# Patient Record
Sex: MALE | Race: WHITE | NOT HISPANIC OR LATINO | Employment: OTHER | URBAN - NONMETROPOLITAN AREA
[De-identification: names, ages, dates, MRNs, and addresses within clinical notes are randomized per-mention and may not be internally consistent; named-entity substitution may affect disease eponyms.]

---

## 2017-01-05 RX ORDER — NITROGLYCERIN 0.4 MG/1
0.4 TABLET SUBLINGUAL
Qty: 25 TABLET | Refills: 2 | Status: SHIPPED | OUTPATIENT
Start: 2017-01-05 | End: 2017-01-06 | Stop reason: SDUPTHER

## 2017-01-06 RX ORDER — NITROGLYCERIN 0.4 MG/1
0.4 TABLET SUBLINGUAL
Qty: 25 TABLET | Refills: 2 | Status: SHIPPED | OUTPATIENT
Start: 2017-01-06 | End: 2018-11-05 | Stop reason: SDUPTHER

## 2017-05-04 ENCOUNTER — OFFICE VISIT (OUTPATIENT)
Dept: CARDIOLOGY | Facility: CLINIC | Age: 72
End: 2017-05-04

## 2017-05-04 VITALS
HEIGHT: 72 IN | DIASTOLIC BLOOD PRESSURE: 71 MMHG | SYSTOLIC BLOOD PRESSURE: 110 MMHG | HEART RATE: 63 BPM | WEIGHT: 184.8 LBS | BODY MASS INDEX: 25.03 KG/M2

## 2017-05-04 DIAGNOSIS — E78.2 MIXED HYPERLIPIDEMIA: ICD-10-CM

## 2017-05-04 DIAGNOSIS — I25.10 CORONARY ARTERY DISEASE INVOLVING NATIVE CORONARY ARTERY OF NATIVE HEART WITHOUT ANGINA PECTORIS: Primary | ICD-10-CM

## 2017-05-04 DIAGNOSIS — I10 ESSENTIAL HYPERTENSION: ICD-10-CM

## 2017-05-04 PROCEDURE — 99213 OFFICE O/P EST LOW 20 MIN: CPT | Performed by: NURSE PRACTITIONER

## 2017-05-04 PROCEDURE — 93000 ELECTROCARDIOGRAM COMPLETE: CPT | Performed by: NURSE PRACTITIONER

## 2017-05-04 RX ORDER — SIMVASTATIN 20 MG
20 TABLET ORAL NIGHTLY
Qty: 30 TABLET | Refills: 11 | Status: SHIPPED | OUTPATIENT
Start: 2017-05-04 | End: 2018-05-27 | Stop reason: SDUPTHER

## 2017-05-04 RX ORDER — CLOPIDOGREL BISULFATE 75 MG/1
75 TABLET ORAL DAILY
Qty: 30 TABLET | Refills: 11 | Status: SHIPPED | OUTPATIENT
Start: 2017-05-04 | End: 2017-11-15

## 2017-05-04 RX ORDER — METOPROLOL SUCCINATE 25 MG/1
25 TABLET, EXTENDED RELEASE ORAL DAILY
Qty: 30 TABLET | Refills: 11 | Status: SHIPPED | OUTPATIENT
Start: 2017-05-04 | End: 2018-08-31 | Stop reason: SDUPTHER

## 2017-06-28 ENCOUNTER — OFFICE VISIT (OUTPATIENT)
Dept: CARDIAC SURGERY | Facility: CLINIC | Age: 72
End: 2017-06-28

## 2017-06-28 VITALS
HEIGHT: 72 IN | BODY MASS INDEX: 24.52 KG/M2 | HEART RATE: 74 BPM | DIASTOLIC BLOOD PRESSURE: 78 MMHG | SYSTOLIC BLOOD PRESSURE: 124 MMHG | WEIGHT: 181 LBS | OXYGEN SATURATION: 99 %

## 2017-06-28 DIAGNOSIS — I71.20 THORACIC ANEURYSM WITHOUT MENTION OF RUPTURE: Primary | ICD-10-CM

## 2017-06-28 PROCEDURE — 99203 OFFICE O/P NEW LOW 30 MIN: CPT | Performed by: THORACIC SURGERY (CARDIOTHORACIC VASCULAR SURGERY)

## 2017-06-28 RX ORDER — TRAMADOL HYDROCHLORIDE 50 MG/1
50 TABLET ORAL EVERY 6 HOURS PRN
COMMUNITY
End: 2017-12-28

## 2017-11-07 ENCOUNTER — DOCUMENTATION (OUTPATIENT)
Dept: CARDIOLOGY | Facility: CLINIC | Age: 72
End: 2017-11-07

## 2017-11-07 NOTE — PROGRESS NOTES
I talked to Mr Chris today and he wants me to mail his order for his Lipid and he will get it done in [place].      I have put it in the mail.

## 2017-11-15 ENCOUNTER — OUTSIDE FACILITY SERVICE (OUTPATIENT)
Dept: CARDIOLOGY | Facility: CLINIC | Age: 72
End: 2017-11-15

## 2017-11-15 PROCEDURE — 99222 1ST HOSP IP/OBS MODERATE 55: CPT | Performed by: INTERNAL MEDICINE

## 2017-11-15 NOTE — PROGRESS NOTES
Pts wife called said pt was in the hospital and they did a lipid on him.  She is going to email it to Miguelina.

## 2017-11-16 DIAGNOSIS — R47.02 EXPRESSIVE DYSPHASIA: ICD-10-CM

## 2017-11-16 DIAGNOSIS — G45.9 TRANSIENT CEREBRAL ISCHEMIA, UNSPECIFIED TYPE: Primary | ICD-10-CM

## 2017-12-28 ENCOUNTER — OFFICE VISIT (OUTPATIENT)
Dept: CARDIOLOGY | Facility: CLINIC | Age: 72
End: 2017-12-28

## 2017-12-28 VITALS
SYSTOLIC BLOOD PRESSURE: 110 MMHG | HEART RATE: 63 BPM | OXYGEN SATURATION: 97 % | WEIGHT: 185 LBS | HEIGHT: 72 IN | DIASTOLIC BLOOD PRESSURE: 70 MMHG | BODY MASS INDEX: 25.06 KG/M2

## 2017-12-28 DIAGNOSIS — I25.10 CORONARY ARTERY DISEASE INVOLVING NATIVE CORONARY ARTERY OF NATIVE HEART WITHOUT ANGINA PECTORIS: ICD-10-CM

## 2017-12-28 DIAGNOSIS — I10 ESSENTIAL HYPERTENSION: ICD-10-CM

## 2017-12-28 DIAGNOSIS — Z72.0 TOBACCO ABUSE: ICD-10-CM

## 2017-12-28 DIAGNOSIS — E78.5 DYSLIPIDEMIA: ICD-10-CM

## 2017-12-28 DIAGNOSIS — G45.9 TRANSIENT CEREBRAL ISCHEMIA, UNSPECIFIED TYPE: Primary | ICD-10-CM

## 2017-12-28 PROCEDURE — 99214 OFFICE O/P EST MOD 30 MIN: CPT | Performed by: INTERNAL MEDICINE

## 2017-12-28 RX ORDER — NAPROXEN SODIUM 220 MG
220 TABLET ORAL 2 TIMES DAILY PRN
COMMUNITY
End: 2018-07-23 | Stop reason: ALTCHOICE

## 2017-12-28 RX ORDER — PANTOPRAZOLE SODIUM 40 MG/1
40 TABLET, DELAYED RELEASE ORAL DAILY
Refills: 3 | COMMUNITY
Start: 2017-12-13 | End: 2021-09-08 | Stop reason: SDUPTHER

## 2017-12-28 NOTE — PROGRESS NOTES
Reason for Visit: cardiovascular follow up.    HPI:  Solomon Perez is a 72 y.o. male is here today for hospital follow-up.  He was admitted back in November with a possible TIA.  Due to questionable history of atrial fibrillation patient was changed from Plavix to anticoagulation with Eliquis.  He has not had any further TIA episodes but did have one episode of dizziness.  An event monitor placed on discharge and he is still wearing this today.  He is transitioning care down to the [place] office since he lives only 7 miles from here.      Previous Cardiac Testing and Procedures:  - Exercise treadmill stress (02/07/2012) negative for ischemia  - Echo (05/12/2015) EF 60-65%, normal diastolic function  - Echo (11/14/2017) EF 55%, mild to moderate MR, mild AI  - Carotid ultrasound (11/14/2017) no hemodynamically significant carotid stenosis    Patient Active Problem List   Diagnosis   • Coronary artery disease involving native coronary artery of native heart without angina pectoris   • Essential hypertension   • Transient cerebral ischemia   • Expressive dysphasia       Social History   Substance Use Topics   • Smoking status: Heavy Tobacco Smoker     Packs/day: 0.50     Types: Cigarettes   • Smokeless tobacco: Former User      Comment: 5 cigg/day    • Alcohol use 1.2 oz/week     2 Cans of beer per week       Family History   Problem Relation Age of Onset   • Coronary artery disease Other    • Colon cancer Mother    • Heart attack Father    • Heart failure Father        The following portions of the patient's history were reviewed and updated as appropriate: allergies, current medications, past family history, past medical history, past social history, past surgical history and problem list.      Current Outpatient Prescriptions:   •  apixaban (ELIQUIS) 5 MG tablet tablet, Take 1 tablet by mouth 2 (Two) Times a Day., Disp: 60 tablet, Rfl: 11  •  aspirin 81 MG tablet, Take 1 tablet by mouth Daily., Disp: 30 tablet,  "Rfl: 11  •  metoprolol succinate XL (TOPROL-XL) 25 MG 24 hr tablet, Take 1 tablet by mouth Daily., Disp: 30 tablet, Rfl: 11  •  naproxen sodium (ALEVE) 220 MG tablet, Take 220 mg by mouth 2 (Two) Times a Day As Needed., Disp: , Rfl:   •  nitroglycerin (NITROSTAT) 0.4 MG SL tablet, Place 1 tablet under the tongue Every 5 (Five) Minutes As Needed for chest pain. Take no more than 3 doses in 15 minutes., Disp: 25 tablet, Rfl: 2  •  pantoprazole (PROTONIX) 40 MG EC tablet, Take 40 mg by mouth Daily., Disp: , Rfl: 3  •  simvastatin (ZOCOR) 20 MG tablet, Take 1 tablet by mouth Every Night., Disp: 30 tablet, Rfl: 11    Review of Systems   Constitution: Negative for chills, diaphoresis, fever, weakness and weight gain.   HENT: Negative for nosebleeds.    Eyes: Negative for visual disturbance.   Cardiovascular: Negative for chest pain, claudication, cyanosis, dyspnea on exertion, irregular heartbeat, leg swelling, near-syncope, orthopnea, palpitations, paroxysmal nocturnal dyspnea and syncope.   Respiratory: Negative for cough, hemoptysis, shortness of breath, sputum production and wheezing.    Hematologic/Lymphatic: Negative for bleeding problem.   Skin: Negative for color change and flushing.   Musculoskeletal: Positive for back pain and muscle cramps. Negative for falls and muscle weakness.   Gastrointestinal: Negative for bloating, abdominal pain, hematemesis, hematochezia, melena, nausea and vomiting.   Genitourinary: Negative for hematuria.   Neurological: Positive for headaches. Negative for dizziness and light-headedness.   Psychiatric/Behavioral: Negative for altered mental status and depression. The patient does not have insomnia and is not nervous/anxious.        Objective   /70 (BP Location: Left arm, Patient Position: Sitting, Cuff Size: Adult)  Pulse 63  Ht 182.9 cm (72\")  Wt 83.9 kg (185 lb)  SpO2 97%  BMI 25.09 kg/m2  Physical Exam   Constitutional: He is oriented to person, place, and time. He " appears well-developed and well-nourished.   HENT:   Head: Normocephalic and atraumatic.   Cardiovascular: Normal rate, regular rhythm and normal heart sounds.    No murmur heard.  Pulmonary/Chest: Effort normal and breath sounds normal.   Musculoskeletal: He exhibits no edema.   Neurological: He is alert and oriented to person, place, and time.   Skin: Skin is warm and dry.   Psychiatric: He has a normal mood and affect.     Procedures      ICD-10-CM ICD-9-CM   1. Transient cerebral ischemia, unspecified type G45.9 435.9   2. Coronary artery disease involving native coronary artery of native heart without angina pectoris I25.10 414.01   3. Essential hypertension I10 401.9   4. Dyslipidemia E78.5 272.4   5. Tobacco abuse Z72.0 305.1         Assessment/Plan:  1. TIA: Recent admission in November with a TIA.  Patient was changed from Plavix to Eliquis due to concern for a possible history of atrial fibrillation; although, no definitive confirmation of this can be found.  An event monitor was ordered on discharge and his still wearing it at this time.    2.  Coronary artery disease: Patient currently chest pain-free.  Currently managed on aspirin, metoprolol, and simvastatin.  Plavix discontinued when started on Eliquis.    3.  Essential hypertension: Pressure is well controlled today on current therapy.    4.  Dyslipidemia: Managed on simvastatin.    5.  Tobacco abuse: Continues to stop approximate half pack per day.'s and expresses some interest and willingness to attempt to quit.  Is opened in trying Chantix.  Will send in a prescription for this.

## 2017-12-29 ENCOUNTER — APPOINTMENT (OUTPATIENT)
Dept: CARDIOLOGY | Facility: HOSPITAL | Age: 72
End: 2017-12-29
Attending: THORACIC SURGERY (CARDIOTHORACIC VASCULAR SURGERY)

## 2017-12-29 ENCOUNTER — APPOINTMENT (OUTPATIENT)
Dept: CT IMAGING | Facility: HOSPITAL | Age: 72
End: 2017-12-29
Attending: THORACIC SURGERY (CARDIOTHORACIC VASCULAR SURGERY)

## 2018-01-02 ENCOUNTER — TELEPHONE (OUTPATIENT)
Dept: CARDIOLOGY | Facility: CLINIC | Age: 73
End: 2018-01-02

## 2018-01-02 NOTE — TELEPHONE ENCOUNTER
PATIENT WIFE SAID THAT DR FRENCH WAS GOING TO SEND IN CHANX FOR PATIENT TO TRY AND QUIT SMOKING BUT IT WASN'T CALLED IN - HE WOULD LIKE IT SENT INTO [facility] IN [place] TODAY IF POSSIBLE

## 2018-01-02 NOTE — TELEPHONE ENCOUNTER
CALLED AND AND INFORMED THE PT OF THE RX BEING SENT TO [facility] AND THAT IT WOULD SENT TO HIM BY SAT AT THE LATEST.   HE VOICED UNDERSTANDING

## 2018-01-08 DIAGNOSIS — R47.02 EXPRESSIVE DYSPHASIA: ICD-10-CM

## 2018-01-08 DIAGNOSIS — G45.9 TRANSIENT CEREBRAL ISCHEMIA, UNSPECIFIED TYPE: ICD-10-CM

## 2018-01-08 LAB — TOAL ENROLLMENT DAYS: 30

## 2018-01-08 NOTE — PROGRESS NOTES
Await Dr. Resendiz's final review and interpretation but no evidence of PAF. Will continue anticoagulation for TIA unless unable to tolerate or bleeding risk outweighs benefit.

## 2018-01-11 NOTE — PROGRESS NOTES
Patient notified of final event monitor results with no afib or arrhythmia.  Will continue Eliquis for now unless has bleeding or other intolerance.  Will keep f/u with Dr. Osman for TAA 2/6/18 and with Dr. Resendiz for 3/29/18.

## 2018-02-02 ENCOUNTER — HOSPITAL ENCOUNTER (OUTPATIENT)
Dept: CARDIOLOGY | Facility: HOSPITAL | Age: 73
Discharge: HOME | End: 2018-02-02
Attending: THORACIC SURGERY (CARDIOTHORACIC VASCULAR SURGERY) | Admitting: THORACIC SURGERY (CARDIOTHORACIC VASCULAR SURGERY)

## 2018-02-02 ENCOUNTER — HOSPITAL ENCOUNTER (OUTPATIENT)
Dept: CT IMAGING | Facility: HOSPITAL | Age: 73
Discharge: HOME | End: 2018-02-02
Attending: THORACIC SURGERY (CARDIOTHORACIC VASCULAR SURGERY)

## 2018-02-02 VITALS
WEIGHT: 185 LBS | BODY MASS INDEX: 25.06 KG/M2 | SYSTOLIC BLOOD PRESSURE: 134 MMHG | HEIGHT: 72 IN | DIASTOLIC BLOOD PRESSURE: 89 MMHG

## 2018-02-02 DIAGNOSIS — I71.20 ANEURYSM OF THORACIC AORTA (HCC): ICD-10-CM

## 2018-02-02 LAB
BH CV ECHO MEAS - AI DEC SLOPE: 180 CM/SEC^2
BH CV ECHO MEAS - AI MAX PG: 58.1 MMHG
BH CV ECHO MEAS - AI MAX VEL: 381 CM/SEC
BH CV ECHO MEAS - AI P1/2T: 620 MSEC
BH CV ECHO MEAS - AO MAX PG (FULL): 0.86 MMHG
BH CV ECHO MEAS - AO MAX PG: 5.1 MMHG
BH CV ECHO MEAS - AO MEAN PG (FULL): 1 MMHG
BH CV ECHO MEAS - AO MEAN PG: 3 MMHG
BH CV ECHO MEAS - AO ROOT AREA: 9.6 CM^2
BH CV ECHO MEAS - AO ROOT DIAM: 3.5 CM
BH CV ECHO MEAS - AO V2 MAX: 113 CM/SEC
BH CV ECHO MEAS - AO V2 MEAN: 75.9 CM/SEC
BH CV ECHO MEAS - AO V2 VTI: 24.5 CM
BH CV ECHO MEAS - ASC AORTA: 3.7 CM
BH CV ECHO MEAS - AVA(I,A): 2.7 CM^2
BH CV ECHO MEAS - AVA(I,D): 2.7 CM^2
BH CV ECHO MEAS - AVA(V,A): 2.9 CM^2
BH CV ECHO MEAS - AVA(V,D): 2.9 CM^2
BH CV ECHO MEAS - CONTRAST EF 4CH: 69 ML/M^2
BH CV ECHO MEAS - EDV(CUBED): 101.8 ML
BH CV ECHO MEAS - EDV(MOD-SP4): 90 ML
BH CV ECHO MEAS - EDV(TEICH): 100.8 ML
BH CV ECHO MEAS - EF(CUBED): 66.6 %
BH CV ECHO MEAS - EF(MOD-SP4): 69 %
BH CV ECHO MEAS - EF(TEICH): 58.1 %
BH CV ECHO MEAS - ESV(CUBED): 34 ML
BH CV ECHO MEAS - ESV(MOD-SP4): 27.9 ML
BH CV ECHO MEAS - ESV(TEICH): 42.2 ML
BH CV ECHO MEAS - FS: 30.6 %
BH CV ECHO MEAS - IVS/LVPW: 1
BH CV ECHO MEAS - IVSD: 0.88 CM
BH CV ECHO MEAS - LA DIMENSION: 3.5 CM
BH CV ECHO MEAS - LA/AO: 1
BH CV ECHO MEAS - LV MASS(C)D: 136.1 GRAMS
BH CV ECHO MEAS - LV MAX PG: 4.2 MMHG
BH CV ECHO MEAS - LV MEAN PG: 2 MMHG
BH CV ECHO MEAS - LV V1 MAX: 103 CM/SEC
BH CV ECHO MEAS - LV V1 MEAN: 73.9 CM/SEC
BH CV ECHO MEAS - LV V1 VTI: 21.1 CM
BH CV ECHO MEAS - LVIDD: 4.7 CM
BH CV ECHO MEAS - LVIDS: 3.2 CM
BH CV ECHO MEAS - LVLD AP4: 8.3 CM
BH CV ECHO MEAS - LVLS AP4: 6.5 CM
BH CV ECHO MEAS - LVOT AREA (M): 3.1 CM^2
BH CV ECHO MEAS - LVOT AREA: 3.1 CM^2
BH CV ECHO MEAS - LVOT DIAM: 2 CM
BH CV ECHO MEAS - LVPWD: 0.87 CM
BH CV ECHO MEAS - MR ALIAS VEL: 30.8 CM/SEC
BH CV ECHO MEAS - MR ERO: 0.03 CM^2
BH CV ECHO MEAS - MR FLOW RATE: 17.4 CM^3/SEC
BH CV ECHO MEAS - MR MAX PG: 103.1 MMHG
BH CV ECHO MEAS - MR MAX VEL: 507.5 CM/SEC
BH CV ECHO MEAS - MR MEAN PG: 75 MMHG
BH CV ECHO MEAS - MR MEAN VEL: 411 CM/SEC
BH CV ECHO MEAS - MR PISA RADIUS: 0.3 CM
BH CV ECHO MEAS - MR PISA: 0.57 CM^2
BH CV ECHO MEAS - MR VOLUME: 4.8 ML
BH CV ECHO MEAS - MR VTI: 139.5 CM
BH CV ECHO MEAS - MV A MAX VEL: 62.1 CM/SEC
BH CV ECHO MEAS - MV DEC TIME: 0.21 SEC
BH CV ECHO MEAS - MV E MAX VEL: 80 CM/SEC
BH CV ECHO MEAS - MV E/A: 1.3
BH CV ECHO MEAS - PA MAX PG: 1.6 MMHG
BH CV ECHO MEAS - PA V2 MAX: 62.6 CM/SEC
BH CV ECHO MEAS - RAP SYSTOLE: 5 MMHG
BH CV ECHO MEAS - RVSP: 19.3 MMHG
BH CV ECHO MEAS - SV(AO): 235.7 ML
BH CV ECHO MEAS - SV(CUBED): 67.8 ML
BH CV ECHO MEAS - SV(LVOT): 66.3 ML
BH CV ECHO MEAS - SV(MOD-SP4): 62.1 ML
BH CV ECHO MEAS - SV(TEICH): 58.6 ML
BH CV ECHO MEAS - TR MAX VEL: 189 CM/SEC
CREAT BLDA-MCNC: 1 MG/DL (ref 0.6–1.3)
E/E' RATIO: 14.4
LV EF 2D ECHO EST: 70 %
MAXIMAL PREDICTED HEART RATE: 149 BPM
STRESS TARGET HR: 127 BPM

## 2018-02-02 PROCEDURE — 71275 CT ANGIOGRAPHY CHEST: CPT

## 2018-02-02 PROCEDURE — 93306 TTE W/DOPPLER COMPLETE: CPT

## 2018-02-02 PROCEDURE — 93306 TTE W/DOPPLER COMPLETE: CPT | Performed by: INTERNAL MEDICINE

## 2018-02-02 PROCEDURE — 82565 ASSAY OF CREATININE: CPT

## 2018-02-02 PROCEDURE — 25010000002 PERFLUTREN 6.52 MG/ML SUSPENSION: Performed by: THORACIC SURGERY (CARDIOTHORACIC VASCULAR SURGERY)

## 2018-02-02 PROCEDURE — 0 IOPAMIDOL PER 1 ML: Performed by: THORACIC SURGERY (CARDIOTHORACIC VASCULAR SURGERY)

## 2018-02-02 RX ADMIN — PERFLUTREN 9.78 MG: 6.52 INJECTION, SUSPENSION INTRAVENOUS at 09:39

## 2018-02-02 RX ADMIN — IOPAMIDOL 150 ML: 755 INJECTION, SOLUTION INTRAVENOUS at 11:00

## 2018-02-28 ENCOUNTER — OFFICE VISIT (OUTPATIENT)
Dept: CARDIAC SURGERY | Facility: CLINIC | Age: 73
End: 2018-02-28

## 2018-02-28 VITALS
SYSTOLIC BLOOD PRESSURE: 118 MMHG | DIASTOLIC BLOOD PRESSURE: 70 MMHG | WEIGHT: 185 LBS | HEART RATE: 83 BPM | BODY MASS INDEX: 25.06 KG/M2 | HEIGHT: 72 IN | OXYGEN SATURATION: 99 %

## 2018-02-28 DIAGNOSIS — I71.20 THORACIC AORTIC ANEURYSM WITHOUT RUPTURE (HCC): ICD-10-CM

## 2018-02-28 DIAGNOSIS — I25.10 CORONARY ARTERY DISEASE INVOLVING NATIVE CORONARY ARTERY OF NATIVE HEART WITHOUT ANGINA PECTORIS: Primary | ICD-10-CM

## 2018-02-28 PROCEDURE — 99213 OFFICE O/P EST LOW 20 MIN: CPT | Performed by: THORACIC SURGERY (CARDIOTHORACIC VASCULAR SURGERY)

## 2018-03-14 PROBLEM — I71.20 THORACIC AORTIC ANEURYSM WITHOUT RUPTURE (HCC): Status: ACTIVE | Noted: 2018-03-14

## 2018-03-14 NOTE — PROGRESS NOTES
Subjective   Patient ID: Solomon Perez is a 72 y.o. male who is here for follow-up of a known aneurysm.   Chief Complaint   Patient presents with   • Thoracic Aneurysm     patient is here with scans to ck on aneursym     History of Present Illness      Since his last office visit no new events.  No chest pain.  No shortness of breath.      The following portions of the patient's history were reviewed and updated as appropriate: allergies, current medications, past family history, past medical history, past social history, past surgical history and problem list.  Still smoking.  Ribs have well healed.         Objective   Physical Exam   Constitutional: He is oriented to person, place, and time. He appears well-developed.   HENT:   Head: Normocephalic and atraumatic.   Mouth/Throat: Oropharynx is clear and moist.   Eyes: EOM are normal. Pupils are equal, round, and reactive to light.   Neck: Normal range of motion. Neck supple. No JVD present. No tracheal deviation present. No thyromegaly present.   Cardiovascular: Normal rate, regular rhythm, normal heart sounds and intact distal pulses.  Exam reveals no gallop and no friction rub.    No murmur heard.  Pulmonary/Chest: Effort normal and breath sounds normal. No respiratory distress. He has no wheezes. He has no rales. He exhibits no tenderness.   Abdominal: Soft. He exhibits no distension. There is no tenderness.   Musculoskeletal: Normal range of motion. He exhibits no edema.   Lymphadenopathy:     He has no cervical adenopathy.   Neurological: He is alert and oriented to person, place, and time. No cranial nerve deficit.   Skin: Skin is warm and dry.   Psychiatric: He has a normal mood and affect.       CT ANGIOGRAM CHEST W CONTRAST- 2/2/2018 10:13 AM CST      HISTORY: thoracic aortic aneurysm; I71.2-Thoracic aortic aneurysm,  without rupture      COMPARISON: None.      DOSE LENGTH PRODUCT: 327 mGy cm. Automated exposure control was also  utilized to decrease  patient radiation dose.     TECHNIQUE: Helical tomographic images of the chest were obtained after  the administration of intravenous contrast following angiogram protocol.  Additionally, 3D and multiplanar reformatted images were provided.        FINDINGS:    Pulmonary arteries: There is adequate enhancement of the pulmonary  arteries to evaluate for central and segmental pulmonary emboli. There  are no filling defects within the main, lobar, segmental or visualized  subsegmental pulmonary arteries.    .      Aorta and great vessels: The ascending aorta is prominent and is 43 mm  in diameter. There is no evidence of dissection. The great vessels arise  from aortic arch in a normal manner. Descending thoracic aorta is  unremarkable.. Coronary calcifications noted..     Visualized neck base: The imaged portion of the base of the neck appears  unremarkable.      Lungs: The lungs are clear. There is no mass, worrisome nodule, or  consolidation. No pleural effusion is seen. The trachea and bronchial  tree are patent.      Heart: The heart is normal in size. There is no pericardial effusion.      Mediastinum and lymph nodes: No enlarged mediastinal, hilar, or axillary  lymph nodes are present.      Skeletal and soft tissues: The osseous structures of the thorax and  surrounding soft tissues demonstrate no acute process.     Upper abdomen: The imaged portion of the upper abdomen demonstrates no  acute process.      IMPRESSION:  1. The ascending aorta is 43 mm in diameter. There is no evidence of  dissection.  2. Otherwise is exam is unremarkable.        This report was finalized on 02/02/2018 10:32 by Dr. Castro Mack MD.    Study Description     2D Echo with Color Flow and Doppler The study is technically difficult for diagnosis.Verbal consent was obtained from the patient to use Definity contrast in order to optimize the study.   Echocardiogram Findings     Left Ventricle Left ventricular systolic function is normal.  Calculated EF = 69%. Estimated EF = 70%. Normal left ventricular cavity size noted. Left ventricular diastolic dysfunction is noted (grade II w/high LAP) consistent with pseudonormalization.      Right Ventricle Normal right ventricular cavity size and systolic function noted.      Left Atrium Normal left atrial size noted.      Right Atrium Normal right atrial size noted.      Aortic Valve The aortic valve is abnormal in structure. The valve exhibits sclerosis. Trace aortic valve regurgitation is present. No aortic valve stenosis is present.      Mitral Valve The mitral valve is grossly normal in structure. Trace mitral valve regurgitation is present. No significant mitral valve stenosis is present.      Tricuspid Valve The tricuspid valve is grossly normal. No tricuspid valve stenosis is present. Trace tricuspid valve regurgitation is present. Estimated right ventricular systolic pressure from tricuspid regurgitation is normal (<35 mmHg).      Pulmonic Valve The pulmonic valve is grossly normal in structure. There is no significant pulmonic valve stenosis present. There is no pulmonic valve regurgitation present.      Greater Vessels Mild dilation of the aortic root is present.      Pericardium There is no evidence of pericardial effusion.              Solomon was seen today for thoracic aneurysm.    Diagnoses and all orders for this visit:    Coronary artery disease involving native coronary artery of native heart without angina pectoris    Thoracic aortic aneurysm without rupture  -     CT Angiogram Chest With Contrast; Future          Assessment/Plan       I discussed the findings on his CT scan the chest as well as the findings on his echocardiogram.  His a sending aortic aneurysm measures 4.3 cm in size and remained stable compared to his last study.  We discussed signs and symptoms of acute aortic pathology which she verbalizes understanding.  He is a smoker and has been counseled.    We discussed his weight  is acceptable for his age and height.  He was congratulated on his success.    RTC 1 year.

## 2018-03-29 ENCOUNTER — OFFICE VISIT (OUTPATIENT)
Dept: CARDIOLOGY | Facility: CLINIC | Age: 73
End: 2018-03-29

## 2018-03-29 VITALS
DIASTOLIC BLOOD PRESSURE: 80 MMHG | WEIGHT: 186 LBS | HEART RATE: 67 BPM | BODY MASS INDEX: 25.19 KG/M2 | OXYGEN SATURATION: 98 % | SYSTOLIC BLOOD PRESSURE: 120 MMHG | HEIGHT: 72 IN

## 2018-03-29 DIAGNOSIS — E78.5 DYSLIPIDEMIA: ICD-10-CM

## 2018-03-29 DIAGNOSIS — I10 ESSENTIAL HYPERTENSION: ICD-10-CM

## 2018-03-29 DIAGNOSIS — Z72.0 TOBACCO ABUSE: ICD-10-CM

## 2018-03-29 DIAGNOSIS — G45.9 TRANSIENT CEREBRAL ISCHEMIA, UNSPECIFIED TYPE: Primary | ICD-10-CM

## 2018-03-29 DIAGNOSIS — I25.10 CORONARY ARTERY DISEASE INVOLVING NATIVE CORONARY ARTERY OF NATIVE HEART WITHOUT ANGINA PECTORIS: ICD-10-CM

## 2018-03-29 PROCEDURE — 99214 OFFICE O/P EST MOD 30 MIN: CPT | Performed by: INTERNAL MEDICINE

## 2018-03-29 NOTE — PROGRESS NOTES
Reason for Visit: cardiovascular follow up.    HPI:  Solomon Perez is a 72 y.o. male is here today for follow-up.  He has done well since last visit.  He took the Chantix for a month and has quit smoking for the most part.  Will just occasionally have one with beer.  He denies any chest pain, palpitations, dizziness, syncope, PND, or orthopnea.  His blood pressure has been well controlled at home.      Previous Cardiac Testing and Procedures:  - Exercise treadmill stress (02/07/2012) negative for ischemia  - Echo (05/12/2015) EF 60-65%, normal diastolic function  - Echo (11/14/2017) EF 55%, mild to moderate MR, mild AI  - Carotid ultrasound (11/14/2017) no hemodynamically significant carotid stenosis  - Event monitor (01/08/2018) sinus rhythm throughout study, no significant pauses, arrhythmias, or events, no symptoms reported.    Patient Active Problem List   Diagnosis   • Coronary artery disease involving native coronary artery of native heart without angina pectoris   • Essential hypertension   • Transient cerebral ischemia   • Expressive dysphasia   • Thoracic aortic aneurysm without rupture   • Dyslipidemia       Social History   Substance Use Topics   • Smoking status: Former Smoker     Packs/day: 0.50     Types: Cigarettes   • Smokeless tobacco: Former User      Comment: 5 cigg/day    • Alcohol use 1.2 oz/week     2 Cans of beer per week       Family History   Problem Relation Age of Onset   • Coronary artery disease Other    • Colon cancer Mother    • Heart attack Father    • Heart failure Father        The following portions of the patient's history were reviewed and updated as appropriate: allergies, current medications, past family history, past medical history, past social history, past surgical history and problem list.      Current Outpatient Prescriptions:   •  apixaban (ELIQUIS) 5 MG tablet tablet, Take 1 tablet by mouth 2 (Two) Times a Day., Disp: 60 tablet, Rfl: 11  •  aspirin 81 MG tablet,  Take 1 tablet by mouth Daily., Disp: 30 tablet, Rfl: 11  •  metoprolol succinate XL (TOPROL-XL) 25 MG 24 hr tablet, Take 1 tablet by mouth Daily., Disp: 30 tablet, Rfl: 11  •  naproxen sodium (ALEVE) 220 MG tablet, Take 220 mg by mouth 2 (Two) Times a Day As Needed., Disp: , Rfl:   •  nitroglycerin (NITROSTAT) 0.4 MG SL tablet, Place 1 tablet under the tongue Every 5 (Five) Minutes As Needed for chest pain. Take no more than 3 doses in 15 minutes., Disp: 25 tablet, Rfl: 2  •  pantoprazole (PROTONIX) 40 MG EC tablet, Take 40 mg by mouth Daily., Disp: , Rfl: 3  •  simvastatin (ZOCOR) 20 MG tablet, Take 1 tablet by mouth Every Night., Disp: 30 tablet, Rfl: 11  •  varenicline (CHANTIX STARTING MONTH PAK) 0.5 MG X 11 & 1 MG X 42 tablet, Take 0.5 mg one daily on days 1-3 and and 0.5 mg twice daily on days 4-7.Then 1 mg twice daily for a total of 12 weeks., Disp: 60 tablet, Rfl: 5    Review of Systems   Constitution: Negative for chills, diaphoresis, fever, weakness and weight gain.   HENT: Negative for nosebleeds.    Eyes: Negative for visual disturbance.   Cardiovascular: Negative for chest pain, claudication, cyanosis, dyspnea on exertion, irregular heartbeat, leg swelling, near-syncope, orthopnea, palpitations, paroxysmal nocturnal dyspnea and syncope.   Respiratory: Negative for cough, hemoptysis, shortness of breath, sputum production and wheezing.    Hematologic/Lymphatic: Negative for bleeding problem.   Skin: Negative for color change and flushing.   Musculoskeletal: Positive for back pain and muscle cramps. Negative for falls and muscle weakness.   Gastrointestinal: Negative for bloating, abdominal pain, hematemesis, hematochezia, melena, nausea and vomiting.   Genitourinary: Negative for hematuria.   Neurological: Positive for headaches and light-headedness. Negative for dizziness.   Psychiatric/Behavioral: Negative for altered mental status and depression. The patient does not have insomnia and is not  "nervous/anxious.        Objective   /80 (BP Location: Right arm, Patient Position: Sitting, Cuff Size: Adult)   Pulse 67   Ht 182.9 cm (72\")   Wt 84.4 kg (186 lb)   SpO2 98%   BMI 25.23 kg/m²   Physical Exam   Constitutional: He is oriented to person, place, and time. He appears well-developed and well-nourished.   HENT:   Head: Normocephalic and atraumatic.   Cardiovascular: Normal rate, regular rhythm and normal heart sounds.    No murmur heard.  Pulmonary/Chest: Effort normal and breath sounds normal.   Musculoskeletal: He exhibits no edema.   Neurological: He is alert and oriented to person, place, and time.   Skin: Skin is warm and dry.   Psychiatric: He has a normal mood and affect.     Procedures      ICD-10-CM ICD-9-CM   1. Transient cerebral ischemia, unspecified type G45.9 435.9   2. Coronary artery disease involving native coronary artery of native heart without angina pectoris I25.10 414.01   3. Essential hypertension I10 401.9   4. Dyslipidemia E78.5 272.4   5. Tobacco abuse Z72.0 305.1         Assessment/Plan:  1. TIA: Recent admission in November with a TIA.  Patient was changed from Plavix to Eliquis due to concern for a possible history of atrial fibrillation; although, no definitive confirmation of this can be found.  An event monitor did not demonstrate any evidence of atrial fibrillation.     2.  Coronary artery disease: Patient remains chest pain-free.   continue therapy with aspirin, metoprolol, and simvastatin.      3.  Essential hypertension: Blood pressure remains well-controlled on current medications.     4.  Dyslipidemia: Continue simvastatin.     5.  Tobacco abuse: Has quit smoking for the most part with just an occasional cigarette when drinking.   patient on the importance of complete smoking cessation.    "

## 2018-05-29 RX ORDER — SIMVASTATIN 20 MG
TABLET ORAL
Qty: 30 TABLET | Refills: 10 | Status: SHIPPED | OUTPATIENT
Start: 2018-05-29 | End: 2018-08-31 | Stop reason: SDUPTHER

## 2018-07-23 ENCOUNTER — OFFICE VISIT (OUTPATIENT)
Dept: CARDIOLOGY | Facility: CLINIC | Age: 73
End: 2018-07-23

## 2018-07-23 VITALS
HEIGHT: 72 IN | BODY MASS INDEX: 24.38 KG/M2 | WEIGHT: 180 LBS | RESPIRATION RATE: 14 BRPM | DIASTOLIC BLOOD PRESSURE: 62 MMHG | OXYGEN SATURATION: 98 % | HEART RATE: 68 BPM | SYSTOLIC BLOOD PRESSURE: 114 MMHG

## 2018-07-23 DIAGNOSIS — I10 ESSENTIAL HYPERTENSION: ICD-10-CM

## 2018-07-23 DIAGNOSIS — G45.9 TRANSIENT CEREBRAL ISCHEMIA, UNSPECIFIED TYPE: Primary | ICD-10-CM

## 2018-07-23 DIAGNOSIS — I25.10 CORONARY ARTERY DISEASE INVOLVING NATIVE CORONARY ARTERY OF NATIVE HEART WITHOUT ANGINA PECTORIS: ICD-10-CM

## 2018-07-23 DIAGNOSIS — H53.121 TRANSIENT VISUAL LOSS OF RIGHT EYE: ICD-10-CM

## 2018-07-23 DIAGNOSIS — Z72.0 TOBACCO ABUSE: ICD-10-CM

## 2018-07-23 DIAGNOSIS — I48.91 ATRIAL FIBRILLATION BY ELECTROCARDIOGRAM (HCC): Chronic | ICD-10-CM

## 2018-07-23 PROBLEM — I25.2 MI, OLD: Status: ACTIVE | Noted: 2018-07-23

## 2018-07-23 PROCEDURE — 93000 ELECTROCARDIOGRAM COMPLETE: CPT | Performed by: NURSE PRACTITIONER

## 2018-07-23 PROCEDURE — 99213 OFFICE O/P EST LOW 20 MIN: CPT | Performed by: NURSE PRACTITIONER

## 2018-07-23 PROCEDURE — 99406 BEHAV CHNG SMOKING 3-10 MIN: CPT | Performed by: NURSE PRACTITIONER

## 2018-07-23 RX ORDER — ERGOCALCIFEROL 1.25 MG/1
50000 CAPSULE ORAL
COMMUNITY
End: 2020-09-02

## 2018-07-23 NOTE — ASSESSMENT & PLAN NOTE
Continue aspirin and Eliquis. Call if symptoms recur or worsen. MRA brain and neck 11/2017 without significant carotid disease and no evidence of CVA at that time.

## 2018-07-23 NOTE — PROGRESS NOTES
Subjective:     Encounter Date:07/23/2018    Chief Complaint:    Patient ID: Solomon Perez is a 73 y.o. male here today for cardiac evaluation sooner than scheduled due to transient loss of vision in R eye last week with no abnormalities found by optometrist. He denies missing any doses of aspirin or Eliquis.    HPI     Eye Problem    Additional comments: Patient lost vision in his right eye for about 1 min last Thursday while he was walking through [place]. He saw his eye doctor and they didn't find anything wrong with his eye but believes he may have had a piece of plaque break off behind his eye.  Patient deines any stroke like symptoms. On aspirin and Eliquis. Hx of TIA.            Coronary Artery Disease    Additional comments: no chest discomfort           Atrial Fibrillation    Additional comments: no palpitations, less bruising on Eliquis than on Plavix           Shortness of Breath    Additional comments: has been unable to quit smoking       Last edited by TAMELA Archibald on 7/23/2018  2:11 PM. (History)          History:   Past Medical History:   Diagnosis Date   • Aneurysm (CMS/[facility])    • Atrial fibrillation by electrocardiogram (CMS/[facility])    • Bilateral carotid bruits    • Chest pain    • Coronary artery disease involving native coronary artery of native heart with angina pectoris (CMS/[facility])      5/2015- ECHO: EF 60-65% 5/2015- DSE: negative for ischemia. 2012- Stress: negative for ischemia.   • Expressive dysphasia 11/16/2017   • History of essential hypertension    • History of PTCA     12/2010- drug-eluding stent to the LAD.   • Hyperlipidemia, mixed    • Malaise and fatigue    • MI, old     2010, LAD stent   • Peptic ulcer     NOS   • Transient cerebral ischemia 11/16/2017     Past Surgical History:   Procedure Laterality Date   • BACK SURGERY     • CORONARY STENT PLACEMENT     • SINUS SURGERY  04/2012     Social History     Social History   • Marital status:      Spouse name:  N/A   • Number of children: N/A   • Years of education: N/A     Occupational History   • Not on file.     Social History Main Topics   • Smoking status: Current Every Day Smoker     Packs/day: 0.50     Years: 40.00     Types: Cigarettes     Start date: 1955   • Smokeless tobacco: Former User      Comment: has quit several times up to 15 years, less than 1 ppd for a total of 40 years.    • Alcohol use 6.0 - 7.2 oz/week     10 - 12 Cans of beer per week   • Drug use: No   • Sexual activity: Defer     Other Topics Concern   • Not on file     Social History Narrative   • No narrative on file     Family History   Problem Relation Age of Onset   • Coronary artery disease Other    • Colon cancer Mother    • Heart attack Father    • Heart failure Father        Outpatient Prescriptions Marked as Taking for the 7/23/18 encounter (Office Visit) with TAMELA Archibald   Medication Sig Dispense Refill   • apixaban (ELIQUIS) 5 MG tablet tablet Take 1 tablet by mouth 2 (Two) Times a Day. 60 tablet 11   • aspirin 81 MG tablet Take 1 tablet by mouth Daily. 30 tablet 11   • metoprolol succinate XL (TOPROL-XL) 25 MG 24 hr tablet Take 1 tablet by mouth Daily. 30 tablet 11   • nitroglycerin (NITROSTAT) 0.4 MG SL tablet Place 1 tablet under the tongue Every 5 (Five) Minutes As Needed for chest pain. Take no more than 3 doses in 15 minutes. 25 tablet 2   • pantoprazole (PROTONIX) 40 MG EC tablet Take 40 mg by mouth Daily.  3   • simvastatin (ZOCOR) 20 MG tablet TAKE 1 TABLET EVERY NIGHT 30 tablet 10   • vitamin D (ERGOCALCIFEROL) 81606 units capsule capsule Take 50,000 Units by mouth Every 7 (Seven) Days.     • [DISCONTINUED] naproxen sodium (ALEVE) 220 MG tablet Take 220 mg by mouth 2 (Two) Times a Day As Needed.         Review of Systems:  Review of Systems   Constitution: Negative for chills, diaphoresis, fever, weakness and weight gain.   HENT: Negative for nosebleeds.    Eyes: Positive for vision loss in right eye (complete,  "transient visual loss 7/19/18 for 1 minute). Negative for visual disturbance.   Cardiovascular: Negative for chest pain, claudication, cyanosis, dyspnea on exertion, irregular heartbeat, leg swelling, near-syncope, orthopnea, palpitations, paroxysmal nocturnal dyspnea and syncope.   Respiratory: Negative for cough, hemoptysis, shortness of breath, sputum production and wheezing.    Hematologic/Lymphatic: Negative for bleeding problem.   Skin: Negative for color change and flushing.   Musculoskeletal: Positive for back pain and muscle cramps. Negative for falls and muscle weakness.   Gastrointestinal: Negative for bloating, abdominal pain, hematemesis, hematochezia, melena, nausea and vomiting.   Genitourinary: Negative for hematuria.   Neurological: Positive for headaches and light-headedness. Negative for dizziness.   Psychiatric/Behavioral: Negative for altered mental status and depression. The patient does not have insomnia and is not nervous/anxious.             Objective:   /62 (BP Location: Right arm, Patient Position: Sitting, Cuff Size: Adult)   Pulse 68   Resp 14   Ht 182.9 cm (72\")   Wt 81.6 kg (180 lb)   SpO2 98%   BMI 24.41 kg/m²   Wt Readings from Last 3 Encounters:   07/23/18 81.6 kg (180 lb)   03/29/18 84.4 kg (186 lb)   02/28/18 83.9 kg (185 lb)         Physical Exam   Constitutional: He is oriented to person, place, and time. He appears well-developed and well-nourished.   HENT:   Head: Normocephalic and atraumatic.   Eyes: No scleral icterus.   Neck: No JVD present.   Cardiovascular: Normal rate, regular rhythm, normal heart sounds and intact distal pulses.  Exam reveals no gallop and no friction rub.    No murmur heard.  Pulmonary/Chest: Effort normal and breath sounds normal.   Musculoskeletal: He exhibits no edema.   Neurological: He is alert and oriented to person, place, and time.   Skin: Skin is warm and dry.   Psychiatric: He has a normal mood and affect.   Vitals " reviewed.      Lab/Diagnostics Review:   - Exercise treadmill stress (02/07/2012) negative for ischemia  - Echo (05/12/2015) EF 60-65%, normal diastolic function  - Echo (11/14/2017) EF 55%, mild to moderate MR, mild AI  - Carotid ultrasound (11/14/2017) no hemodynamically significant carotid stenosis  - Event monitor (01/08/2018) sinus rhythm throughout study, no significant pauses, arrhythmias, or events, no symptoms reported.      ECG 12 Lead  Date/Time: 7/23/2018 2:17 PM  Performed by: MIRZA SAWYER  Authorized by: MIRZA SAWYER   Comparison: compared with previous ECG from 11/13/2017  Similar to previous ECG  Rhythm: sinus rhythm  Rate: normal  BPM: 68  QRS axis: normal  Clinical impression: normal ECG                Assessment/Plan:         Problem List Items Addressed This Visit        Cardiovascular and Mediastinum    Coronary artery disease involving native coronary artery of native heart without angina pectoris (Chronic)    Current Assessment & Plan     Stable, Continue present therapy.           Essential hypertension (Chronic)    Current Assessment & Plan     Well controlled with medications.         Transient cerebral ischemia - Primary (Chronic)    Current Assessment & Plan     Continue aspirin and Eliquis. Call if symptoms recur or worsen. MRA brain and neck 11/2017 without significant carotid disease and no evidence of CVA at that time.          Relevant Orders    ECG 12 Lead (Completed)    Atrial fibrillation by electrocardiogram (CMS/[facility]) (Chronic)    Current Assessment & Plan     Paroxysmal, maintaining sinus rhythm            Other    Tobacco abuse (Chronic)    Current Assessment & Plan     Counseled on smoking cessation for 3 minutes. He will consider quitting again. Declines pharmacotherapy at this time.         Transient visual loss of right eye    Current Assessment & Plan     Possible TIA, no bleed per eye doctor. Continue present therapy. Call if recurs.              Return  for Next scheduled follow up 9/2018 with Dr. Resendiz.           Samantha Castaneda, APRN, ACNP-BC, CHFN-BC

## 2018-07-23 NOTE — ASSESSMENT & PLAN NOTE
Counseled on smoking cessation for 3 minutes. He will consider quitting again. Declines pharmacotherapy at this time.

## 2018-09-04 RX ORDER — METOPROLOL SUCCINATE 25 MG/1
25 TABLET, EXTENDED RELEASE ORAL DAILY
Qty: 45 TABLET | Refills: 3 | Status: SHIPPED | OUTPATIENT
Start: 2018-09-04 | End: 2019-08-15 | Stop reason: SDUPTHER

## 2018-09-04 RX ORDER — SIMVASTATIN 20 MG
20 TABLET ORAL NIGHTLY
Qty: 90 TABLET | Refills: 3 | Status: SHIPPED | OUTPATIENT
Start: 2018-09-04 | End: 2019-08-15 | Stop reason: SDUPTHER

## 2018-11-02 ENCOUNTER — TELEPHONE (OUTPATIENT)
Dept: CARDIOLOGY | Facility: CLINIC | Age: 73
End: 2018-11-02

## 2018-11-02 NOTE — TELEPHONE ENCOUNTER
Ok to stop Eliquis for 3 days prior to colonscopy but prefer he not stop aspirin given history of cardiac stent and previous possible TIA. Please notify Dr. Bowden's office. TX!

## 2018-11-05 DIAGNOSIS — I25.10 CORONARY ARTERY DISEASE INVOLVING NATIVE CORONARY ARTERY OF NATIVE HEART WITHOUT ANGINA PECTORIS: Primary | Chronic | ICD-10-CM

## 2018-11-05 RX ORDER — NITROGLYCERIN 0.4 MG/1
0.4 TABLET SUBLINGUAL
Qty: 25 TABLET | Refills: 11 | Status: SHIPPED | OUTPATIENT
Start: 2018-11-05 | End: 2019-10-28 | Stop reason: SDUPTHER

## 2018-11-05 NOTE — TELEPHONE ENCOUNTER
Pt called and wanted to make sure that you knew that he has an aneurysm.  He didn't know if that would affect him having the colonoscopy on Thursday.

## 2018-11-05 NOTE — TELEPHONE ENCOUNTER
He would like you to send in a script for Nitroglycerin to [facility] in [place].  He said he does not use them hardly at all, but his script is  and he does keep them with him.

## 2018-11-05 NOTE — TELEPHONE ENCOUNTER
Dr. Osman has been following his 4.3 cm ascending thoracic aneurysm that has been stable. Should be ok to have colonoscopy but he can check with Dr. Osman if he wishes. He needs to be sure Dr. Bowden is aware. TX!

## 2019-02-22 ENCOUNTER — TRANSCRIBE ORDERS (OUTPATIENT)
Dept: CARDIAC SURGERY | Facility: CLINIC | Age: 74
End: 2019-02-22

## 2019-02-22 DIAGNOSIS — I71.20 THORACIC AORTIC ANEURYSM WITHOUT RUPTURE (HCC): Primary | ICD-10-CM

## 2019-03-11 ENCOUNTER — HOSPITAL ENCOUNTER (OUTPATIENT)
Dept: CT IMAGING | Facility: HOSPITAL | Age: 74
Discharge: HOME | End: 2019-03-11
Admitting: THORACIC SURGERY (CARDIOTHORACIC VASCULAR SURGERY)

## 2019-03-11 DIAGNOSIS — I71.20 THORACIC AORTIC ANEURYSM WITHOUT RUPTURE (HCC): ICD-10-CM

## 2019-03-11 LAB — CREAT BLDA-MCNC: 1.1 MG/DL (ref 0.6–1.3)

## 2019-03-11 PROCEDURE — 0 IOPAMIDOL PER 1 ML: Performed by: THORACIC SURGERY (CARDIOTHORACIC VASCULAR SURGERY)

## 2019-03-11 PROCEDURE — 82565 ASSAY OF CREATININE: CPT

## 2019-03-11 PROCEDURE — 71275 CT ANGIOGRAPHY CHEST: CPT

## 2019-03-11 RX ADMIN — IOPAMIDOL 100 ML: 755 INJECTION, SOLUTION INTRAVENOUS at 10:07

## 2019-03-13 ENCOUNTER — OFFICE VISIT (OUTPATIENT)
Dept: CARDIAC SURGERY | Facility: CLINIC | Age: 74
End: 2019-03-13

## 2019-03-13 ENCOUNTER — APPOINTMENT (OUTPATIENT)
Dept: CT IMAGING | Facility: HOSPITAL | Age: 74
End: 2019-03-13

## 2019-03-13 VITALS
WEIGHT: 185.4 LBS | SYSTOLIC BLOOD PRESSURE: 118 MMHG | BODY MASS INDEX: 25.11 KG/M2 | HEART RATE: 67 BPM | DIASTOLIC BLOOD PRESSURE: 62 MMHG | HEIGHT: 72 IN | OXYGEN SATURATION: 99 %

## 2019-03-13 DIAGNOSIS — I71.20 THORACIC AORTIC ANEURYSM WITHOUT RUPTURE (HCC): Primary | ICD-10-CM

## 2019-03-13 DIAGNOSIS — I48.91 ATRIAL FIBRILLATION BY ELECTROCARDIOGRAM (HCC): Chronic | ICD-10-CM

## 2019-03-13 PROCEDURE — 99213 OFFICE O/P EST LOW 20 MIN: CPT | Performed by: THORACIC SURGERY (CARDIOTHORACIC VASCULAR SURGERY)

## 2019-04-02 NOTE — PROGRESS NOTES
Subjective   Patient ID: Solomon Perez is a 73 y.o. male who is here for follow-up of a known aneurysm.   Chief Complaint   Patient presents with   • Thoracic Aneurysm     Patient is here for 1 yr follow up.      History of Present Illness    No new events other than colonoscopy.  No concerning findings.  No chest pain.  No shortness of breath.    Still smoking.   The following portions of the patient's history were reviewed and updated as appropriate: allergies, current medications, past family history, past medical history, past social history, past surgical history and problem list.           Objective   Physical Exam   Constitutional: He is oriented to person, place, and time. He appears well-developed.   HENT:   Head: Normocephalic and atraumatic.   Mouth/Throat: Oropharynx is clear and moist.   Eyes: EOM are normal. Pupils are equal, round, and reactive to light.   Neck: Normal range of motion. Neck supple. No JVD present. No tracheal deviation present. No thyromegaly present.   Cardiovascular: Normal rate, regular rhythm, normal heart sounds and intact distal pulses. Exam reveals no gallop and no friction rub.   No murmur heard.  Pulmonary/Chest: Effort normal and breath sounds normal. No respiratory distress. He has no wheezes. He has no rales. He exhibits no tenderness.   Abdominal: Soft. He exhibits no distension. There is no tenderness.   Musculoskeletal: Normal range of motion. He exhibits no edema.   Lymphadenopathy:     He has no cervical adenopathy.   Neurological: He is alert and oriented to person, place, and time. No cranial nerve deficit.   Skin: Skin is warm and dry.   Psychiatric: He has a normal mood and affect.       CT ANGIOGRAM CHEST W CONTRAST- 2/2/2018 10:13 AM CST      HISTORY: thoracic aortic aneurysm; I71.2-Thoracic aortic aneurysm,  without rupture      COMPARISON: None.      DOSE LENGTH PRODUCT: 327 mGy cm. Automated exposure control was also  utilized to decrease patient radiation  dose.     TECHNIQUE: Helical tomographic images of the chest were obtained after  the administration of intravenous contrast following angiogram protocol.  Additionally, 3D and multiplanar reformatted images were provided.        FINDINGS:    Pulmonary arteries: There is adequate enhancement of the pulmonary  arteries to evaluate for central and segmental pulmonary emboli. There  are no filling defects within the main, lobar, segmental or visualized  subsegmental pulmonary arteries.    .      Aorta and great vessels: The ascending aorta is prominent and is 43 mm  in diameter. There is no evidence of dissection. The great vessels arise  from aortic arch in a normal manner. Descending thoracic aorta is  unremarkable.. Coronary calcifications noted..     Visualized neck base: The imaged portion of the base of the neck appears  unremarkable.      Lungs: The lungs are clear. There is no mass, worrisome nodule, or  consolidation. No pleural effusion is seen. The trachea and bronchial  tree are patent.      Heart: The heart is normal in size. There is no pericardial effusion.      Mediastinum and lymph nodes: No enlarged mediastinal, hilar, or axillary  lymph nodes are present.      Skeletal and soft tissues: The osseous structures of the thorax and  surrounding soft tissues demonstrate no acute process.     Upper abdomen: The imaged portion of the upper abdomen demonstrates no  acute process.      IMPRESSION:  1. The ascending aorta is 43 mm in diameter. There is no evidence of  dissection.  2. Otherwise is exam is unremarkable.        This report was finalized on 02/02/2018 10:32 by Dr. Castro Mack MD.    Study Description     2D Echo with Color Flow and Doppler The study is technically difficult for diagnosis.Verbal consent was obtained from the patient to use Definity contrast in order to optimize the study.   Echocardiogram Findings     Left Ventricle Left ventricular systolic function is normal. Calculated EF =  69%. Estimated EF = 70%. Normal left ventricular cavity size noted. Left ventricular diastolic dysfunction is noted (grade II w/high LAP) consistent with pseudonormalization.      Right Ventricle Normal right ventricular cavity size and systolic function noted.      Left Atrium Normal left atrial size noted.      Right Atrium Normal right atrial size noted.      Aortic Valve The aortic valve is abnormal in structure. The valve exhibits sclerosis. Trace aortic valve regurgitation is present. No aortic valve stenosis is present.      Mitral Valve The mitral valve is grossly normal in structure. Trace mitral valve regurgitation is present. No significant mitral valve stenosis is present.      Tricuspid Valve The tricuspid valve is grossly normal. No tricuspid valve stenosis is present. Trace tricuspid valve regurgitation is present. Estimated right ventricular systolic pressure from tricuspid regurgitation is normal (<35 mmHg).      Pulmonic Valve The pulmonic valve is grossly normal in structure. There is no significant pulmonic valve stenosis present. There is no pulmonic valve regurgitation present.      Greater Vessels Mild dilation of the aortic root is present.      Pericardium There is no evidence of pericardial effusion.        Study Result     EXAMINATION: CT ANGIOGRAM CHEST W CONTRAST- 3/11/2019 10:24 AM CDT     HISTORY: Aortic disease, non-traumatic, known or suspect; I71.2-Thoracic  aortic aneurysm, without rupture     DOSE: 256 mGycm (Automatic exposure control technique was implemented in  an effort to keep the radiation dose as low as possible without  compromising image quality)     REPORT: Spiral CTA of the chest was performed after administration of  intravenous contrast from the thoracic inlet through the upper abdomen.  Reconstructed 3-D, coronal and sagittal images were also reviewed.     Comparison: CTA chest 2/2/2018.     Review of lung windows demonstrates mild respiratory motion  artifact,  minimal bibasilar atelectasis. No lung mass or infiltrate is identified.  There is no pneumothorax or pleural effusion. Soft tissue windows show a  homogeneous appearance of the visualized thyroid gland. The contrast  bolus is satisfactory. The pulmonary arteries are normal in caliber,  without evidence of pulmonary embolism. The thoracic aorta is ectatic,  there is aneurysmal dilation of the ascending aorta, with maximum  diameter of 4.1 cm, essentially unchanged, given differences in  measuring technique. There is no evidence of aneurysm rupture and no  dissection is identified. The arch level, the aorta has a maximum  diameter of 2.9 cm, the descending thoracic aorta has a maximum diameter  2.7 cm. Heart size is normal. Heavy atherosclerotic calcification is  noted within the LAD coronary artery distribution as before. No  intrathoracic lymphadenopathy is identified. Mild mucosal thickening  involving the distal esophagus may be related to esophagitis. The  visualized upper abdomen is unremarkable. Review of bone windows shows  extensive degenerative spurring throughout the thoracic spine, lower  cervical spine.     IMPRESSION:  1. Stable CT of the chest, with aneurysmal dilation of the ascending  aorta, with a maximum diameter 4.1 cm, the slight size difference  compared with the previous CT this most likely due to measuring  technique. There is no evidence of aneurysm rupture or dissection.  2. Heavy calcified atherosclerosis but plaque within the LAD coronary  artery distribution.  3. Mild esophageal wall thickening distally may represent mild  esophagitis.              Solomon was seen today for thoracic aneurysm.    Diagnoses and all orders for this visit:    Thoracic aortic aneurysm without rupture (CMS/HCC)  -     CT Angiogram Chest With Contrast; Future    Atrial fibrillation by electrocardiogram (CMS/HCC)          Assessment/Plan       I discussed the findings on his CT scan the chest as well as  the findings on his echocardiogram.  His ascending aortic aneurysm measured 4.2 today and previously 4.3 cm in size and has remained essentially stable compared to his last study.  We discussed signs and symptoms of acute aortic pathology which she verbalizes understanding.  He is a smoker and has been counseled.    We discussed his weight is acceptable for his age and height.  He was congratulated on his success.    RTC 1 year.

## 2019-08-16 RX ORDER — SIMVASTATIN 20 MG
TABLET ORAL
Qty: 90 TABLET | Refills: 0 | Status: SHIPPED | OUTPATIENT
Start: 2019-08-16 | End: 2019-09-04 | Stop reason: SDUPTHER

## 2019-08-16 RX ORDER — METOPROLOL SUCCINATE 25 MG/1
TABLET, EXTENDED RELEASE ORAL
Qty: 45 TABLET | Refills: 0 | Status: SHIPPED | OUTPATIENT
Start: 2019-08-16 | End: 2019-09-04 | Stop reason: SDUPTHER

## 2019-08-16 RX ORDER — APIXABAN 5 MG/1
TABLET, FILM COATED ORAL
Qty: 180 TABLET | Refills: 0 | Status: SHIPPED | OUTPATIENT
Start: 2019-08-16 | End: 2019-09-04 | Stop reason: SDUPTHER

## 2019-08-16 NOTE — TELEPHONE ENCOUNTER
LAST APPT 7/23/18. PT NEEDS APPT FOR FURTHER MED REFILLS. WILL ONLY SEND IN 90 DAYS SUPPLY UNTIL PT IS SEEN.

## 2019-09-04 ENCOUNTER — OFFICE VISIT (OUTPATIENT)
Dept: CARDIOLOGY | Facility: CLINIC | Age: 74
End: 2019-09-04

## 2019-09-04 VITALS
WEIGHT: 175 LBS | BODY MASS INDEX: 23.7 KG/M2 | HEART RATE: 61 BPM | DIASTOLIC BLOOD PRESSURE: 68 MMHG | HEIGHT: 72 IN | SYSTOLIC BLOOD PRESSURE: 108 MMHG | OXYGEN SATURATION: 96 %

## 2019-09-04 DIAGNOSIS — E78.5 DYSLIPIDEMIA: ICD-10-CM

## 2019-09-04 DIAGNOSIS — I71.20 THORACIC AORTIC ANEURYSM WITHOUT RUPTURE (HCC): ICD-10-CM

## 2019-09-04 DIAGNOSIS — Z86.79 HX OF CARDIAC ARRHYTHMIA: ICD-10-CM

## 2019-09-04 DIAGNOSIS — Z86.73 HISTORY OF TIA (TRANSIENT ISCHEMIC ATTACK): ICD-10-CM

## 2019-09-04 DIAGNOSIS — Z72.0 TOBACCO ABUSE: Chronic | ICD-10-CM

## 2019-09-04 DIAGNOSIS — R42 DIZZINESS: ICD-10-CM

## 2019-09-04 DIAGNOSIS — I10 ESSENTIAL HYPERTENSION: Chronic | ICD-10-CM

## 2019-09-04 DIAGNOSIS — I25.10 CORONARY ARTERY DISEASE INVOLVING NATIVE CORONARY ARTERY OF NATIVE HEART WITHOUT ANGINA PECTORIS: Primary | Chronic | ICD-10-CM

## 2019-09-04 PROBLEM — I25.2 MI, OLD: Status: RESOLVED | Noted: 2018-07-23 | Resolved: 2019-09-04

## 2019-09-04 PROCEDURE — 99406 BEHAV CHNG SMOKING 3-10 MIN: CPT | Performed by: INTERNAL MEDICINE

## 2019-09-04 PROCEDURE — 93000 ELECTROCARDIOGRAM COMPLETE: CPT | Performed by: INTERNAL MEDICINE

## 2019-09-04 PROCEDURE — 99214 OFFICE O/P EST MOD 30 MIN: CPT | Performed by: INTERNAL MEDICINE

## 2019-09-04 RX ORDER — SIMVASTATIN 20 MG
20 TABLET ORAL NIGHTLY
Qty: 90 TABLET | Refills: 3 | Status: SHIPPED | OUTPATIENT
Start: 2019-09-04 | End: 2020-09-02 | Stop reason: SDUPTHER

## 2019-09-04 RX ORDER — METOPROLOL SUCCINATE 25 MG/1
TABLET, EXTENDED RELEASE ORAL
Qty: 45 TABLET | Refills: 3 | Status: SHIPPED | OUTPATIENT
Start: 2019-09-04 | End: 2020-09-02 | Stop reason: SDUPTHER

## 2019-09-04 NOTE — PROGRESS NOTES
Reason for Visit: cardiovascular follow up.    HPI:  Solomon Perez is a 74 y.o. male is here today for 1 year follow-up.  He is doing well for the most part.  He does note intermittent dizziness episodes.  He feels these several times a week.  Usually worse when he stands up quickly or looks up.  He does not check his blood pressure or pulse during his episodes.  He has not had any significant palpitations.  He denies any chest pain, syncope, PND, or orthopnea.  Continues to smoke about 1/2 pack/day.  He previously quit temporarily following [facility].  He has not ready or willing to quit at this time.    Previous Cardiac Testing and Procedures:  - Exercise treadmill stress (02/07/2012) negative for ischemia  - Echo (05/12/2015) EF 60-65%, normal diastolic function  - Echo (11/14/2017) EF 55%, mild to moderate MR, mild AI  - Carotid ultrasound (11/14/2017) no hemodynamically significant carotid stenosis  - Event monitor (1/8/2018) sinus rhythm throughout, no significant pauses, arrhythmias, or events, no symptoms  - CTA chest (3/11/2019) ascending aortic dilation up to 4.1 cm    Patient Active Problem List   Diagnosis   • Coronary artery disease involving native coronary artery of native heart without angina pectoris   • Essential hypertension   • Transient cerebral ischemia   • Expressive dysphasia   • Thoracic aortic aneurysm without rupture (CMS/HCC)   • Dyslipidemia   • Atrial fibrillation by electrocardiogram (CMS/[facility])   • Transient visual loss of right eye   • Tobacco abuse       Social History     Tobacco Use   • Smoking status: Current Every Day Smoker     Packs/day: 0.50     Years: 40.00     Pack years: 20.00     Types: Cigarettes     Start date: 1955   • Smokeless tobacco: Former User   • Tobacco comment: has quit several times up to 15 years, less than 1 ppd for a total of 40 years.    Substance Use Topics   • Alcohol use: Yes     Alcohol/week: 6.0 - 7.2 oz     Types: 10 - 12 Cans of beer per week   •  "Drug use: No       Family History   Problem Relation Age of Onset   • Coronary artery disease Other    • Colon cancer Mother    • Heart attack Father    • Heart failure Father        The following portions of the patient's history were reviewed and updated as appropriate: allergies, current medications, past family history, past medical history, past social history, past surgical history and problem list.      Current Outpatient Medications:   •  apixaban (ELIQUIS) 5 MG tablet tablet, Take 1 tablet by mouth Every 12 (Twelve) Hours., Disp: 180 tablet, Rfl: 3  •  aspirin 81 MG tablet, Take 1 tablet by mouth Daily., Disp: 30 tablet, Rfl: 11  •  metoprolol succinate XL (TOPROL-XL) 25 MG 24 hr tablet, Take 1/2 tablet daily, Disp: 45 tablet, Rfl: 3  •  pantoprazole (PROTONIX) 40 MG EC tablet, Take 40 mg by mouth Daily., Disp: , Rfl: 3  •  simvastatin (ZOCOR) 20 MG tablet, Take 1 tablet by mouth Every Night., Disp: 90 tablet, Rfl: 3  •  nitroglycerin (NITROSTAT) 0.4 MG SL tablet, Place 1 tablet under the tongue Every 5 (Five) Minutes As Needed for Chest Pain. Take no more than 3 doses in 15 minutes., Disp: 25 tablet, Rfl: 11  •  vitamin D (ERGOCALCIFEROL) 55326 units capsule capsule, Take 50,000 Units by mouth Every 7 (Seven) Days., Disp: , Rfl:     Review of Systems   Constitution: Negative for chills and fever.   Cardiovascular: Negative for chest pain and paroxysmal nocturnal dyspnea.   Respiratory: Negative for cough and shortness of breath.    Skin: Negative for rash.   Gastrointestinal: Negative for abdominal pain and heartburn.   Neurological: Positive for dizziness. Negative for numbness.       Objective   /68 (BP Location: Left arm, Patient Position: Sitting, Cuff Size: Adult)   Pulse 61   Ht 182.9 cm (72.01\")   Wt 79.4 kg (175 lb)   SpO2 96%   BMI 23.73 kg/m²   Physical Exam   Constitutional: He is oriented to person, place, and time. He appears well-developed and well-nourished.   HENT:   Head: " Normocephalic and atraumatic.   Cardiovascular: Normal rate, regular rhythm and normal heart sounds.   No murmur heard.  Pulmonary/Chest: Effort normal and breath sounds normal.   Musculoskeletal: He exhibits no edema.   Neurological: He is alert and oriented to person, place, and time.   Skin: Skin is warm and dry.   Psychiatric: He has a normal mood and affect.       ECG 12 Lead  Date/Time: 9/4/2019 10:25 AM  Performed by: Shawn Resendiz MD  Authorized by: Shawn Resendiz MD   Comparison: compared with previous ECG from 7/23/2018  Similar to previous ECG  Rhythm: sinus rhythm  Rate: normal  QRS axis: normal    Clinical impression: normal ECG              ICD-10-CM ICD-9-CM   1. Coronary artery disease involving native coronary artery of native heart without angina pectoris I25.10 414.01   2. Hx of cardiac arrhythmia Z86.79 V12.59   3. History of TIA (transient ischemic attack) Z86.73 V12.54   4. Thoracic aortic aneurysm without rupture (CMS/HCC) I71.2 441.2   5. Essential hypertension I10 401.9   6. Dyslipidemia E78.5 272.4   7. Tobacco abuse Z72.0 305.1   8. Dizziness R42 780.4         Assessment/Plan:  1.  Coronary artery disease: History of PCI in approximately 2010 at [facility].  Will obtain copy of catheter report.  No current chest pain.  Continue aspirin, metoprolol, and simvastatin.    2.  Questionable history of arrhythmia: Patient with questionable history of atrial fibrillation.  This was a suspected possible cause of his TIA; however, there is no EKG or monitor available that definitively demonstrates this.  He remains on anticoagulation with Eliquis.    3.  History of TIA: Unclear etiology.  Concern for possible atrial fibrillation but no definitive diagnosis.  Managed on anticoagulation with Eliquis.    4.  Thoracic aortic aneurysm: Up to 4.1 cm on CTA from 3/2019.  Continue to monitor.    5.  Essential hypertension: Blood pressures well controlled today.  Continue current medications.    6.   Dyslipidemia: Continue simvastatin.  Obtain copy of last lipid panel from PCP.    7.  Tobacco abuse: Solomon Perez is a current cigarettes user.  He currently smokes 10 cigarettes per day for a duration of 40 years. I have educated him on the risk of diseases from using tobacco products such as cancer, COPD and heart diease.  I advised him to quit and he is not willing to quit.  I spent 6 minutes counseling the patient.    8.  Dizziness: May have a component of orthostasis.  Symptoms usually occur when standing or looking up.  Encourage good hydration.  If worsens will consider stopping metoprolol.  Also could consider repeat monitor if symptoms worsen.

## 2019-10-28 DIAGNOSIS — I25.10 CORONARY ARTERY DISEASE INVOLVING NATIVE CORONARY ARTERY OF NATIVE HEART WITHOUT ANGINA PECTORIS: Chronic | ICD-10-CM

## 2019-10-29 RX ORDER — NITROGLYCERIN 0.4 MG/1
0.4 TABLET SUBLINGUAL
Qty: 25 TABLET | Refills: 11 | Status: SHIPPED | OUTPATIENT
Start: 2019-10-29 | End: 2023-04-04

## 2020-03-11 ENCOUNTER — OFFICE VISIT (OUTPATIENT)
Dept: CARDIAC SURGERY | Facility: CLINIC | Age: 75
End: 2020-03-11

## 2020-03-11 ENCOUNTER — HOSPITAL ENCOUNTER (OUTPATIENT)
Dept: CT IMAGING | Facility: HOSPITAL | Age: 75
Discharge: HOME | End: 2020-03-11
Admitting: THORACIC SURGERY (CARDIOTHORACIC VASCULAR SURGERY)

## 2020-03-11 VITALS
HEART RATE: 86 BPM | BODY MASS INDEX: 24.79 KG/M2 | WEIGHT: 183 LBS | OXYGEN SATURATION: 98 % | HEIGHT: 72 IN | DIASTOLIC BLOOD PRESSURE: 62 MMHG | SYSTOLIC BLOOD PRESSURE: 106 MMHG

## 2020-03-11 DIAGNOSIS — Z72.0 TOBACCO ABUSE: Chronic | ICD-10-CM

## 2020-03-11 DIAGNOSIS — I71.20 THORACIC AORTIC ANEURYSM WITHOUT RUPTURE (HCC): ICD-10-CM

## 2020-03-11 DIAGNOSIS — I71.20 THORACIC AORTIC ANEURYSM WITHOUT RUPTURE (HCC): Primary | ICD-10-CM

## 2020-03-11 LAB — CREAT BLDA-MCNC: 1 MG/DL (ref 0.6–1.3)

## 2020-03-11 PROCEDURE — 82565 ASSAY OF CREATININE: CPT

## 2020-03-11 PROCEDURE — 71275 CT ANGIOGRAPHY CHEST: CPT

## 2020-03-11 PROCEDURE — 0 IOPAMIDOL PER 1 ML: Performed by: THORACIC SURGERY (CARDIOTHORACIC VASCULAR SURGERY)

## 2020-03-11 PROCEDURE — 99213 OFFICE O/P EST LOW 20 MIN: CPT | Performed by: THORACIC SURGERY (CARDIOTHORACIC VASCULAR SURGERY)

## 2020-03-11 RX ADMIN — IOPAMIDOL 100 ML: 755 INJECTION, SOLUTION INTRAVENOUS at 11:32

## 2020-04-02 NOTE — PROGRESS NOTES
Subjective   Patient ID: Solomon Perez is a 74 y.o. male who is here for follow-up of a known aneurysm.   Chief Complaint   Patient presents with   • Thoracic Aneurysm     Patient is here for a follow up w/ ct.      History of Present Illness    No new events.  No chest pain or shortness of breath.  He still is smoking a half pack of cigarettes per day.  He denies unintended weight loss, hoarseness of voice, chest pain, hemoptysis, history of pneumonia, or cough.  He was seen by Dr. Resendiz in the last six months.  HO TIA is noted.      The following portions of the patient's history were reviewed and updated as appropriate: allergies, current medications, past family history, past medical history, past social history, past surgical history and problem list.           Objective   Physical Exam   Constitutional: He is oriented to person, place, and time. He appears well-developed.   HENT:   Head: Normocephalic and atraumatic.   Mouth/Throat: Oropharynx is clear and moist.   Eyes: Pupils are equal, round, and reactive to light. EOM are normal.   Neck: Normal range of motion. Neck supple. No JVD present. No tracheal deviation present. No thyromegaly present.   Cardiovascular: Normal rate, regular rhythm, normal heart sounds and intact distal pulses. Exam reveals no gallop and no friction rub.   No murmur heard.  Pulmonary/Chest: Effort normal and breath sounds normal. No respiratory distress. He has no wheezes. He has no rales. He exhibits no tenderness.   Abdominal: Soft. He exhibits no distension. There is no tenderness.   Musculoskeletal: Normal range of motion. He exhibits no edema.   Lymphadenopathy:     He has no cervical adenopathy.   Neurological: He is alert and oriented to person, place, and time. No cranial nerve deficit.   Skin: Skin is warm and dry.   Psychiatric: He has a normal mood and affect.       CT ANGIOGRAM CHEST W CONTRAST- 2/2/2018 10:13 AM CST      HISTORY: thoracic aortic aneurysm; I71.2-Thoracic  aortic aneurysm,  without rupture      COMPARISON: None.      DOSE LENGTH PRODUCT: 327 mGy cm. Automated exposure control was also  utilized to decrease patient radiation dose.     TECHNIQUE: Helical tomographic images of the chest were obtained after  the administration of intravenous contrast following angiogram protocol.  Additionally, 3D and multiplanar reformatted images were provided.        FINDINGS:    Pulmonary arteries: There is adequate enhancement of the pulmonary  arteries to evaluate for central and segmental pulmonary emboli. There  are no filling defects within the main, lobar, segmental or visualized  subsegmental pulmonary arteries.    .      Aorta and great vessels: The ascending aorta is prominent and is 43 mm  in diameter. There is no evidence of dissection. The great vessels arise  from aortic arch in a normal manner. Descending thoracic aorta is  unremarkable.. Coronary calcifications noted..     Visualized neck base: The imaged portion of the base of the neck appears  unremarkable.      Lungs: The lungs are clear. There is no mass, worrisome nodule, or  consolidation. No pleural effusion is seen. The trachea and bronchial  tree are patent.      Heart: The heart is normal in size. There is no pericardial effusion.      Mediastinum and lymph nodes: No enlarged mediastinal, hilar, or axillary  lymph nodes are present.      Skeletal and soft tissues: The osseous structures of the thorax and  surrounding soft tissues demonstrate no acute process.     Upper abdomen: The imaged portion of the upper abdomen demonstrates no  acute process.      IMPRESSION:  1. The ascending aorta is 43 mm in diameter. There is no evidence of  dissection.  2. Otherwise is exam is unremarkable.        This report was finalized on 02/02/2018 10:32 by Dr. Castro Mack MD.    Study Description     2D Echo with Color Flow and Doppler The study is technically difficult for diagnosis.Verbal consent was obtained from the  patient to use Definity contrast in order to optimize the study.   Echocardiogram Findings     Left Ventricle Left ventricular systolic function is normal. Calculated EF = 69%. Estimated EF = 70%. Normal left ventricular cavity size noted. Left ventricular diastolic dysfunction is noted (grade II w/high LAP) consistent with pseudonormalization.      Right Ventricle Normal right ventricular cavity size and systolic function noted.      Left Atrium Normal left atrial size noted.      Right Atrium Normal right atrial size noted.      Aortic Valve The aortic valve is abnormal in structure. The valve exhibits sclerosis. Trace aortic valve regurgitation is present. No aortic valve stenosis is present.      Mitral Valve The mitral valve is grossly normal in structure. Trace mitral valve regurgitation is present. No significant mitral valve stenosis is present.      Tricuspid Valve The tricuspid valve is grossly normal. No tricuspid valve stenosis is present. Trace tricuspid valve regurgitation is present. Estimated right ventricular systolic pressure from tricuspid regurgitation is normal (<35 mmHg).      Pulmonic Valve The pulmonic valve is grossly normal in structure. There is no significant pulmonic valve stenosis present. There is no pulmonic valve regurgitation present.      Greater Vessels Mild dilation of the aortic root is present.      Pericardium There is no evidence of pericardial effusion.        Study Result     EXAMINATION: CT ANGIOGRAM CHEST W CONTRAST- 3/11/2019 10:24 AM CDT     HISTORY: Aortic disease, non-traumatic, known or suspect; I71.2-Thoracic  aortic aneurysm, without rupture     DOSE: 256 mGycm (Automatic exposure control technique was implemented in  an effort to keep the radiation dose as low as possible without  compromising image quality)     REPORT: Spiral CTA of the chest was performed after administration of  intravenous contrast from the thoracic inlet through the upper  abdomen.  Reconstructed 3-D, coronal and sagittal images were also reviewed.     Comparison: CTA chest 2/2/2018.     Review of lung windows demonstrates mild respiratory motion artifact,  minimal bibasilar atelectasis. No lung mass or infiltrate is identified.  There is no pneumothorax or pleural effusion. Soft tissue windows show a  homogeneous appearance of the visualized thyroid gland. The contrast  bolus is satisfactory. The pulmonary arteries are normal in caliber,  without evidence of pulmonary embolism. The thoracic aorta is ectatic,  there is aneurysmal dilation of the ascending aorta, with maximum  diameter of 4.1 cm, essentially unchanged, given differences in  measuring technique. There is no evidence of aneurysm rupture and no  dissection is identified. The arch level, the aorta has a maximum  diameter of 2.9 cm, the descending thoracic aorta has a maximum diameter  2.7 cm. Heart size is normal. Heavy atherosclerotic calcification is  noted within the LAD coronary artery distribution as before. No  intrathoracic lymphadenopathy is identified. Mild mucosal thickening  involving the distal esophagus may be related to esophagitis. The  visualized upper abdomen is unremarkable. Review of bone windows shows  extensive degenerative spurring throughout the thoracic spine, lower  cervical spine.     IMPRESSION:  1. Stable CT of the chest, with aneurysmal dilation of the ascending  aorta, with a maximum diameter 4.1 cm, the slight size difference  compared with the previous CT this most likely due to measuring  technique. There is no evidence of aneurysm rupture or dissection.  2. Heavy calcified atherosclerosis but plaque within the LAD coronary  artery distribution.  3. Mild esophageal wall thickening distally may represent mild  esophagitis.      Study Result     CT angiography with 3D MIP images the chest with IV contrast     Indication: Aortic dz, non-traumatic, known or suspect; I71.2-Thoracic  aortic  aneurysm, without rupture     Comparison: 03/11/2019     DOSE LENGTH PRODUCT: 339 mGy cm. Automated exposure control was also  utilized to decrease patient radiation dose.     Findings:     Stable ectasia of the ascending thoracic aorta measuring 4.1 cm  diameter. Moderate coronary artery calcification. Three-vessel aortic  arch with widely patent branch origins. No central pulmonary artery  filling defect. No pericardial effusion. Mild atherosclerotic narrowing  of the LEFT renal artery origin.     Central airways are clear. No consolidation or pleural effusion. No  suspicious pulmonary nodule or mass. No enlarged thoracic lymph nodes.  No acute findings in the upper abdomen. Thoracic spine degenerative  change. No acute osseous findings.     IMPRESSION:  Impression:     Stable ectasia of the ascending thoracic aorta measuring 4.1 cm  diameter.  This report was finalized on 03/11/2020 12:36 by Dr. Percy Riddle MD.             Solomon was seen today for thoracic aneurysm.    Diagnoses and all orders for this visit:    Thoracic aortic aneurysm without rupture (CMS/HCC)  -     CT Angiogram Chest; Future    Tobacco abuse  -     CT Angiogram Chest; Future          Assessment/Plan     Independent review of the performed CT angiogram is notable for a 4.2 cm in the short axis in greatest dimension aneurysm with fusiform morphology.  No dissection.  No IMH.  It is noted that previously his ascending aortic aneurysm measured 4.2 a year ago and prior to that 4.3 cm in size.  This has been stable over all.  We discussed signs and symptoms of acute aortic pathology which he verbalizes understanding.  He is a smoker and has been counseled x7 minutes.      Patient's Body mass index is 24.81 kg/m². BMI is within normal parameters. No follow-up required.    RTC 1 year with CT angiogram.

## 2020-09-02 ENCOUNTER — OFFICE VISIT (OUTPATIENT)
Dept: CARDIOLOGY | Facility: CLINIC | Age: 75
End: 2020-09-02

## 2020-09-02 VITALS
BODY MASS INDEX: 23.3 KG/M2 | SYSTOLIC BLOOD PRESSURE: 120 MMHG | OXYGEN SATURATION: 98 % | HEIGHT: 72 IN | HEART RATE: 75 BPM | DIASTOLIC BLOOD PRESSURE: 78 MMHG | WEIGHT: 172 LBS

## 2020-09-02 DIAGNOSIS — Z72.0 TOBACCO ABUSE: Chronic | ICD-10-CM

## 2020-09-02 DIAGNOSIS — E78.5 DYSLIPIDEMIA: ICD-10-CM

## 2020-09-02 DIAGNOSIS — F10.10 ALCOHOL ABUSE: ICD-10-CM

## 2020-09-02 DIAGNOSIS — I25.10 CORONARY ARTERY DISEASE INVOLVING NATIVE CORONARY ARTERY OF NATIVE HEART WITHOUT ANGINA PECTORIS: Primary | Chronic | ICD-10-CM

## 2020-09-02 DIAGNOSIS — R42 DIZZINESS: ICD-10-CM

## 2020-09-02 DIAGNOSIS — I10 ESSENTIAL HYPERTENSION: Chronic | ICD-10-CM

## 2020-09-02 DIAGNOSIS — Z86.73 HISTORY OF TIA (TRANSIENT ISCHEMIC ATTACK): ICD-10-CM

## 2020-09-02 DIAGNOSIS — I48.0 PAROXYSMAL ATRIAL FIBRILLATION (HCC): ICD-10-CM

## 2020-09-02 DIAGNOSIS — I71.20 THORACIC AORTIC ANEURYSM WITHOUT RUPTURE (HCC): ICD-10-CM

## 2020-09-02 PROCEDURE — 99214 OFFICE O/P EST MOD 30 MIN: CPT | Performed by: INTERNAL MEDICINE

## 2020-09-02 RX ORDER — METOPROLOL SUCCINATE 25 MG/1
TABLET, EXTENDED RELEASE ORAL
Qty: 45 TABLET | Refills: 3 | Status: SHIPPED | OUTPATIENT
Start: 2020-09-02 | End: 2021-09-08 | Stop reason: SDUPTHER

## 2020-09-02 RX ORDER — SIMVASTATIN 20 MG
20 TABLET ORAL NIGHTLY
Qty: 90 TABLET | Refills: 3 | Status: SHIPPED | OUTPATIENT
Start: 2020-09-02 | End: 2021-09-08 | Stop reason: SDUPTHER

## 2020-09-02 NOTE — PROGRESS NOTES
Reason for Visit: cardiovascular follow up.    HPI:  Solomon Perez is a 75 y.o. male is here today for 1 year follow-up.  He has been doing well and has no new issues or complaints.  He denies any significant chest pain, palpitations, syncope, PND, or orthopnea.  He still has intermittent dizzy episodes and at times felt like he might pass out but he has never lost consciousness.  This has been going on for years and has not changed.  He stays active but is unable to get much formal exercise due to his back.  He does a lot of activities [place].  He continues to smoke and has no interest in quit.  He also reports drinking heavy.  Usually around 7-8 alcoholic beverages a day.  He acknowledges understanding of the risks and has no interest in cutting back.    Previous Cardiac Testing and Procedures:  -[facility] (12/15/2010) 90% stenosis in the mid LAD just prior to the first diagonal branch, first septal  with 80% proximal stenosis, 50% mid RCA stenosis, PCI with 2.5 x 18 mm Promus ABRAHAN to mid LAD.,  EF 70%  - Exercise treadmill stress (02/07/2012) negative for ischemia  - Echo (05/12/2015) EF 60-65%, normal diastolic function  - Echo (11/14/2017) EF 55%, mild to moderate MR, mild AI  - Carotid ultrasound (11/14/2017) no hemodynamically significant carotid stenosis  - Event monitor (1/8/2018) sinus rhythm throughout, no significant pauses, arrhythmias, or events, no symptoms  - Lipid panel (8/1/2018) total cholesterol 139, HDL 59, LDL 69, triglycerides 56  - CTA chest (3/11/2019) ascending aortic dilation up to 4.1 cm  - CTA chest (3/11/2020) stable ectasia of the ascending thoracic aorta measuring 4.1 cm    Patient Active Problem List   Diagnosis   • Coronary artery disease involving native coronary artery of native heart without angina pectoris   • Essential hypertension   • Transient cerebral ischemia   • Expressive dysphasia   • Thoracic aortic aneurysm without rupture (CMS/HCC)   • Dyslipidemia   •  Paroxysmal atrial fibrillation (CMS/HCC)   • Transient visual loss of right eye   • Tobacco abuse   • Alcohol abuse       Social History     Tobacco Use   • Smoking status: Current Every Day Smoker     Packs/day: 0.50     Years: 40.00     Pack years: 20.00     Types: Cigarettes     Start date: 1955   • Smokeless tobacco: Former User   • Tobacco comment: has quit several times up to 15 years, less than 1 ppd for a total of 40 years.    Substance Use Topics   • Alcohol use: Yes     Alcohol/week: 10.0 - 12.0 standard drinks     Types: 10 - 12 Cans of beer per week   • Drug use: No       Family History   Problem Relation Age of Onset   • Coronary artery disease Other    • Colon cancer Mother    • Heart attack Father    • Heart failure Father        The following portions of the patient's history were reviewed and updated as appropriate: allergies, current medications, past family history, past medical history, past social history, past surgical history and problem list.      Current Outpatient Medications:   •  apixaban (Eliquis) 5 MG tablet tablet, Take 1 tablet by mouth Every 12 (Twelve) Hours., Disp: 180 tablet, Rfl: 3  •  aspirin 81 MG tablet, Take 1 tablet by mouth Daily., Disp: 30 tablet, Rfl: 11  •  metoprolol succinate XL (TOPROL-XL) 25 MG 24 hr tablet, Take 1/2 tablet daily, Disp: 45 tablet, Rfl: 3  •  pantoprazole (PROTONIX) 40 MG EC tablet, Take 40 mg by mouth Daily., Disp: , Rfl: 3  •  simvastatin (ZOCOR) 20 MG tablet, Take 1 tablet by mouth Every Night., Disp: 90 tablet, Rfl: 3  •  nitroglycerin (NITROSTAT) 0.4 MG SL tablet, Place 1 tablet under the tongue Every 5 (Five) Minutes As Needed for Chest Pain. Take no more than 3 doses in 15 minutes., Disp: 25 tablet, Rfl: 11    Review of Systems   Constitution: Negative for chills and fever.   Cardiovascular: Positive for near-syncope. Negative for chest pain and paroxysmal nocturnal dyspnea.   Respiratory: Negative for cough and shortness of breath.    Skin:  "Negative for rash.   Gastrointestinal: Negative for abdominal pain and heartburn.   Neurological: Positive for dizziness and light-headedness. Negative for numbness.       Objective   /78 (BP Location: Left arm, Patient Position: Sitting, Cuff Size: Adult)   Pulse 75   Ht 182.9 cm (72.01\")   Wt 78 kg (172 lb)   SpO2 98%   BMI 23.32 kg/m²   Physical Exam   Constitutional: He is oriented to person, place, and time. He appears well-developed and well-nourished.   HENT:   Head: Normocephalic and atraumatic.   Cardiovascular: Normal rate, regular rhythm and normal heart sounds.   No murmur heard.  Pulmonary/Chest: Effort normal and breath sounds normal.   Musculoskeletal: He exhibits no edema.   Neurological: He is alert and oriented to person, place, and time.   Skin: Skin is warm and dry.   Psychiatric: He has a normal mood and affect.     Procedures      ICD-10-CM ICD-9-CM   1. Coronary artery disease involving native coronary artery of native heart without angina pectoris I25.10 414.01   2. Paroxysmal atrial fibrillation (CMS/HCC) I48.0 427.31   3. History of TIA (transient ischemic attack) Z86.73 V12.54   4. Thoracic aortic aneurysm without rupture (CMS/HCC) I71.2 441.2   5. Essential hypertension I10 401.9   6. Dyslipidemia E78.5 272.4   7. Tobacco abuse Z72.0 305.1   8. Dizziness R42 780.4   9. Alcohol abuse F10.10 305.00         Assessment/Plan:  1.  Coronary artery disease: History of PCI to LAD in 2010.  He remains asymptomatic.  Continue aspirin, metoprolol, and simvastatin.     2.  Questionable history of arrhythmia: Questionable history of atrial fibrillation and was a suspected cause of his TIA.  No EKG or monitor study available that documents this.  No significant palpitations.  Continue anticoagulation with Eliquis.     3.  History of TIA: Concern for atrial fibrillation as possible etiology but no evidence of definitive diagnosis.  Continue anticoagulation with Eliquis.       4.  Thoracic " aortic aneurysm: Stable at 4.1 cm on repeat CTA from 3/11/2019.  Follows with Dr. Osman of CT surgery.     5.  Essential hypertension: Blood pressures remains well controlled on current therapy.     6.  Dyslipidemia: Well-controlled on simvastatin.     7.  Tobacco abuse: Solomon Perez  reports that he has been smoking cigarettes. He started smoking about 65 years ago. He has a 20.00 pack-year smoking history. He has quit using smokeless tobacco.. I have educated him on the risk of diseases from using tobacco products such as cancer, COPD and heart diease.  I advised him to quit and he is not willing to quit.  I spent 4 minutes counseling the patient.     8.  Dizziness: Continue episodes of dizziness.  Dehydration and alcohol use are possible etiologies.  Patient reports symptoms remain stable.    9.  Alcohol abuse: Patient reports history of unhealthy drinking levels.  Counseled him on the health risks of continued heavy alcohol use.  He acknowledges understanding and expresses no desire to cut back.

## 2021-01-28 ENCOUNTER — OFFICE VISIT (OUTPATIENT)
Dept: CARDIOLOGY | Facility: CLINIC | Age: 76
End: 2021-01-28

## 2021-01-28 VITALS
HEIGHT: 72 IN | SYSTOLIC BLOOD PRESSURE: 110 MMHG | DIASTOLIC BLOOD PRESSURE: 72 MMHG | OXYGEN SATURATION: 98 % | HEART RATE: 66 BPM | BODY MASS INDEX: 23.57 KG/M2 | WEIGHT: 174 LBS

## 2021-01-28 DIAGNOSIS — F10.10 ALCOHOL ABUSE: ICD-10-CM

## 2021-01-28 DIAGNOSIS — I10 ESSENTIAL HYPERTENSION: Chronic | ICD-10-CM

## 2021-01-28 DIAGNOSIS — E78.5 DYSLIPIDEMIA: ICD-10-CM

## 2021-01-28 DIAGNOSIS — I71.20 THORACIC AORTIC ANEURYSM WITHOUT RUPTURE (HCC): ICD-10-CM

## 2021-01-28 DIAGNOSIS — Z86.79 HISTORY OF CARDIAC ARRHYTHMIA: ICD-10-CM

## 2021-01-28 DIAGNOSIS — Z72.0 TOBACCO ABUSE: Chronic | ICD-10-CM

## 2021-01-28 DIAGNOSIS — R42 DIZZINESS: Primary | ICD-10-CM

## 2021-01-28 DIAGNOSIS — I25.10 CORONARY ARTERY DISEASE INVOLVING NATIVE CORONARY ARTERY OF NATIVE HEART WITHOUT ANGINA PECTORIS: Chronic | ICD-10-CM

## 2021-01-28 DIAGNOSIS — G45.9 TRANSIENT CEREBRAL ISCHEMIA, UNSPECIFIED TYPE: Chronic | ICD-10-CM

## 2021-01-28 PROCEDURE — 93000 ELECTROCARDIOGRAM COMPLETE: CPT | Performed by: INTERNAL MEDICINE

## 2021-01-28 PROCEDURE — 99214 OFFICE O/P EST MOD 30 MIN: CPT | Performed by: INTERNAL MEDICINE

## 2021-01-28 NOTE — PROGRESS NOTES
Reason for Visit: Lightheadedness, dizziness.    HPI:  Solomon Perez is a 75 y.o. male is here today for follow-up of worsening lightheadedness and dizziness.  He has had two significant dizziness episodes.  He is usually standing on his feet when they happen and sometimes he is walking.  They last about 5 minutes before resolving.  He has not lost consciousness but feels like he is close.  He has had some vision changes in his right eye as well.  He denies any chest pain, palpitations, syncope, PND, or orthopnea.  He does not drink much water.  He continues to drink alcohol intermittently.      Previous Cardiac Testing and Procedures:  - LHC (12/15/2010) 90% stenosis in the mid LAD just prior to the first diagonal branch, first septal  with 80% proximal stenosis, 50% mid RCA stenosis, PCI with 2.5 x 18 mm Promus ABRAHAN to mid LAD.,  EF 70%  - Exercise treadmill stress (02/07/2012) negative for ischemia  - Echo (05/12/2015) EF 60-65%, normal diastolic function  - Echo (11/14/2017) EF 55%, mild to moderate MR, mild AI  - Carotid ultrasound (11/14/2017) no hemodynamically significant carotid stenosis  - Event monitor (1/8/2018) sinus rhythm throughout, no significant pauses, arrhythmias, or events, no symptoms  - Lipid panel (8/1/2018) total cholesterol 139, HDL 59, LDL 69, triglycerides 56  - CTA chest (3/11/2019) ascending aortic dilation up to 4.1 cm  - CTA chest (3/11/2020) stable ectasia of the ascending thoracic aorta measuring 4.1 cm    Patient Active Problem List   Diagnosis   • Coronary artery disease involving native coronary artery of native heart without angina pectoris   • Essential hypertension   • Transient cerebral ischemia   • Expressive dysphasia   • Thoracic aortic aneurysm without rupture (CMS/HCC)   • Dyslipidemia   • Paroxysmal atrial fibrillation (CMS/HCC)   • Transient visual loss of right eye   • Tobacco abuse   • Alcohol abuse       Social History     Tobacco Use   • Smoking  status: Current Every Day Smoker     Packs/day: 0.50     Years: 40.00     Pack years: 20.00     Types: Cigarettes     Start date: 1955   • Smokeless tobacco: Former User   • Tobacco comment: has quit several times up to 15 years, less than 1 ppd for a total of 40 years.    Substance Use Topics   • Alcohol use: Yes     Alcohol/week: 10.0 - 12.0 standard drinks     Types: 10 - 12 Cans of beer per week   • Drug use: No       Family History   Problem Relation Age of Onset   • Coronary artery disease Other    • Colon cancer Mother    • Heart attack Father    • Heart failure Father        The following portions of the patient's history were reviewed and updated as appropriate: allergies, current medications, past family history, past medical history, past social history, past surgical history and problem list.      Current Outpatient Medications:   •  apixaban (Eliquis) 5 MG tablet tablet, Take 1 tablet by mouth Every 12 (Twelve) Hours., Disp: 180 tablet, Rfl: 3  •  aspirin 81 MG tablet, Take 1 tablet by mouth Daily., Disp: 30 tablet, Rfl: 11  •  metoprolol succinate XL (TOPROL-XL) 25 MG 24 hr tablet, Take 1/2 tablet daily, Disp: 45 tablet, Rfl: 3  •  pantoprazole (PROTONIX) 40 MG EC tablet, Take 40 mg by mouth Daily., Disp: , Rfl: 3  •  simvastatin (ZOCOR) 20 MG tablet, Take 1 tablet by mouth Every Night., Disp: 90 tablet, Rfl: 3  •  nitroglycerin (NITROSTAT) 0.4 MG SL tablet, Place 1 tablet under the tongue Every 5 (Five) Minutes As Needed for Chest Pain. Take no more than 3 doses in 15 minutes., Disp: 25 tablet, Rfl: 11    Review of Systems   Constitution: Negative for chills, fever and malaise/fatigue.   Eyes: Positive for visual disturbance.   Cardiovascular: Positive for near-syncope. Negative for chest pain, dyspnea on exertion, irregular heartbeat, palpitations and paroxysmal nocturnal dyspnea.   Respiratory: Negative for cough and shortness of breath.    Skin: Negative for rash.   Gastrointestinal: Negative for  "abdominal pain and heartburn.   Neurological: Positive for dizziness and light-headedness. Negative for numbness.       Objective   /72 (BP Location: Left arm, Patient Position: Sitting, Cuff Size: Adult)   Pulse 66   Ht 182.9 cm (72.01\")   Wt 78.9 kg (174 lb)   SpO2 98%   BMI 23.59 kg/m²   Constitutional:       Appearance: Well-developed.   HENT:      Head: Normocephalic and atraumatic.   Pulmonary:      Effort: Pulmonary effort is normal.      Breath sounds: Normal breath sounds.   Cardiovascular:      Normal rate. Regular rhythm.   Skin:     General: Skin is warm and dry.   Neurological:      Mental Status: Alert and oriented to person, place, and time.         ECG 12 Lead    Date/Time: 1/28/2021 11:00 AM  Performed by: Shawn Resendiz MD  Authorized by: Shawn Resendiz MD   Comparison: compared with previous ECG from 9/4/2019  Similar to previous ECG  Rhythm: sinus rhythm  Rate: normal    Clinical impression: normal ECG              ICD-10-CM ICD-9-CM   1. Dizziness  R42 780.4   2. Coronary artery disease involving native coronary artery of native heart without angina pectoris  I25.10 414.01   3. History of cardiac arrhythmia  Z86.79 V12.59   4. Transient cerebral ischemia, unspecified type  G45.9 435.9   5. Thoracic aortic aneurysm without rupture (CMS/HCC)  I71.2 441.2   6. Essential hypertension  I10 401.9   7. Dyslipidemia  E78.5 272.4   8. Tobacco abuse  Z72.0 305.1   9. Alcohol abuse  F10.10 305.00         Assessment/Plan:  1.  Dizziness: Recurrent episodes that are intermittent.  No evidence of cardiac etiology and he has no cardiac symptoms either during the episodes or at any other time.  Normal sinus rhythm on EKG today.  Dehydration felt to be likely culprit behind these episodes.  His alcohol use may also be contributing.  He was counseled extensively on the importance of staying well-hydrated and minimizing alcohol use.  He was also instructed to sit down before the episodes of " dizziness occur.    2.  Coronary artery disease: History of PCI to LAD in 2010.  He remains chest pain-free.  Continue aspirin, metoprolol, and simvastatin.     3.  Questionable history of arrhythmia:  He has a questionable history of atrial fibrillation  that was the suspected cause of his TIA.  There is never been any EKG or monitor study that is available showing atrial fibrillation.  He remains in sinus rhythm on EKG today.  He has not had any palpitations or other symptoms of arrhythmia.  Continue anticoagulation with Eliquis.     4.  History of TIA: Concern for atrial fibrillation as possible etiology but no definitive evidence of this.  He has been managed on anticoagulation with Eliquis.       5.  Thoracic aortic aneurysm: Stable at 4.1 cm on repeat CTA from 3/11/2020. Follows with Dr. Osman of CT surgery.     6.  Essential hypertension: Blood pressure remains within normal limits.  Discussed stopping metoprolol due to dizziness episodes but patient reports he was told never to stop taking this.     7.  Dyslipidemia:  Continue simvastatin.     8.  Tobacco abuse: Solomon Perez  reports that he has been smoking cigarettes. He started smoking about 66 years ago. He has a 20.00 pack-year smoking history. He has quit using smokeless tobacco.. I have educated him on the risk of diseases from using tobacco products such as cancer, COPD and heart disease. I advised him to quit and he is not willing to quit. I spent 3  minutes counseling the patient.    9.  Alcohol abuse: History of unhealthy drinking levels.  Patient reports intermittent alcohol use currently.  Counseled on the importance of minimizing alcohol use and avoiding any more than 1-2 alcoholic beverages a day.

## 2021-03-17 ENCOUNTER — APPOINTMENT (OUTPATIENT)
Dept: CT IMAGING | Facility: HOSPITAL | Age: 76
End: 2021-03-17

## 2021-03-19 ENCOUNTER — TELEPHONE (OUTPATIENT)
Dept: CARDIAC SURGERY | Facility: CLINIC | Age: 76
End: 2021-03-19

## 2021-03-19 DIAGNOSIS — I71.20 THORACIC AORTIC ANEURYSM WITHOUT RUPTURE (HCC): Primary | ICD-10-CM

## 2021-03-19 NOTE — TELEPHONE ENCOUNTER
Pt called to resched appts he missed earlier this week.  Appt with Dr Osman resched for 21 but CT order will  before that date.  Can someone put in a new order for CT so I can resched this test appt?  Thanks!/sumit

## 2021-04-21 ENCOUNTER — HOSPITAL ENCOUNTER (OUTPATIENT)
Dept: CT IMAGING | Facility: HOSPITAL | Age: 76
Discharge: HOME | End: 2021-04-21
Admitting: NURSE PRACTITIONER

## 2021-04-21 ENCOUNTER — OFFICE VISIT (OUTPATIENT)
Dept: CARDIAC SURGERY | Facility: CLINIC | Age: 76
End: 2021-04-21

## 2021-04-21 VITALS
HEART RATE: 75 BPM | WEIGHT: 176.2 LBS | BODY MASS INDEX: 23.86 KG/M2 | OXYGEN SATURATION: 97 % | DIASTOLIC BLOOD PRESSURE: 82 MMHG | SYSTOLIC BLOOD PRESSURE: 127 MMHG | HEIGHT: 72 IN

## 2021-04-21 DIAGNOSIS — Z72.0 TOBACCO ABUSE: Chronic | ICD-10-CM

## 2021-04-21 DIAGNOSIS — I71.20 THORACIC AORTIC ANEURYSM WITHOUT RUPTURE (HCC): Primary | ICD-10-CM

## 2021-04-21 DIAGNOSIS — I71.20 THORACIC AORTIC ANEURYSM WITHOUT RUPTURE (HCC): ICD-10-CM

## 2021-04-21 LAB — CREAT BLDA-MCNC: 1 MG/DL (ref 0.6–1.3)

## 2021-04-21 PROCEDURE — 99213 OFFICE O/P EST LOW 20 MIN: CPT | Performed by: THORACIC SURGERY (CARDIOTHORACIC VASCULAR SURGERY)

## 2021-04-21 PROCEDURE — 0 IOPAMIDOL PER 1 ML: Performed by: NURSE PRACTITIONER

## 2021-04-21 PROCEDURE — 82565 ASSAY OF CREATININE: CPT

## 2021-04-21 PROCEDURE — 71275 CT ANGIOGRAPHY CHEST: CPT

## 2021-04-21 RX ADMIN — IOPAMIDOL 100 ML: 755 INJECTION, SOLUTION INTRAVENOUS at 12:34

## 2021-05-23 NOTE — PROGRESS NOTES
Subjective   Patient ID: Solomon Perez is a 76 y.o. male who is here for follow-up of a known aneurysm.   Chief Complaint   Patient presents with   • Thoracic Aneurysm     Pt is here for follow up w/CT     History of Present Illness    Since his last office visit, he reports having increasing dizziness.  Dr. Resendiz has seen him recently as well.  At this time a cardiac origin is not thought to be the case.  Volume status is concerned.  The patient has been advised to increase his hydration status and he has done so.  The frequency has markedly reduced.  He has had 1 additional bout of dizziness since then but was not nearly as severe either.  No chest pain nor shortness of breath.  Unfortunately is still smoking 1/2 pack of cigarettes per day.  Previously I noted that he had a TIA.  No further events in regards to cerebrovascular findings.    He denies unintended weight loss, hoarseness of voice, chest pain, hemoptysis, history of pneumonia, or cough.    The following portions of the patient's history were reviewed and updated as appropriate: allergies, current medications, past family history, past medical history, past social history, past surgical history and problem list.           Objective   Physical Exam   Constitutional: He is oriented to person, place, and time. He appears well-developed.   HENT:   Head: Normocephalic and atraumatic.   Eyes: Pupils are equal, round, and reactive to light.   Neck: No JVD present. No tracheal deviation present. No thyromegaly present.   Cardiovascular: Normal rate, regular rhythm and normal heart sounds. Exam reveals no gallop and no friction rub.   No murmur heard.  Pulmonary/Chest: Effort normal and breath sounds normal. No respiratory distress. He has no wheezes. He has no rales. He exhibits no tenderness.   Abdominal: Soft. He exhibits no distension. There is no abdominal tenderness.   Musculoskeletal: Normal range of motion.   Lymphadenopathy:     He has no cervical  adenopathy.   Neurological: He is alert and oriented to person, place, and time. No cranial nerve deficit.   Skin: Skin is warm and dry.       CT ANGIOGRAM CHEST W CONTRAST- 2/2/2018 10:13 AM CST      HISTORY: thoracic aortic aneurysm; I71.2-Thoracic aortic aneurysm,  without rupture      COMPARISON: None.      DOSE LENGTH PRODUCT: 327 mGy cm. Automated exposure control was also  utilized to decrease patient radiation dose.     TECHNIQUE: Helical tomographic images of the chest were obtained after  the administration of intravenous contrast following angiogram protocol.  Additionally, 3D and multiplanar reformatted images were provided.        FINDINGS:    Pulmonary arteries: There is adequate enhancement of the pulmonary  arteries to evaluate for central and segmental pulmonary emboli. There  are no filling defects within the main, lobar, segmental or visualized  subsegmental pulmonary arteries.    .      Aorta and great vessels: The ascending aorta is prominent and is 43 mm  in diameter. There is no evidence of dissection. The great vessels arise  from aortic arch in a normal manner. Descending thoracic aorta is  unremarkable.. Coronary calcifications noted..     Visualized neck base: The imaged portion of the base of the neck appears  unremarkable.      Lungs: The lungs are clear. There is no mass, worrisome nodule, or  consolidation. No pleural effusion is seen. The trachea and bronchial  tree are patent.      Heart: The heart is normal in size. There is no pericardial effusion.      Mediastinum and lymph nodes: No enlarged mediastinal, hilar, or axillary  lymph nodes are present.      Skeletal and soft tissues: The osseous structures of the thorax and  surrounding soft tissues demonstrate no acute process.     Upper abdomen: The imaged portion of the upper abdomen demonstrates no  acute process.      IMPRESSION:  1. The ascending aorta is 43 mm in diameter. There is no evidence of  dissection.  2. Otherwise is  exam is unremarkable.        This report was finalized on 02/02/2018 10:32 by Dr. Castro Mack MD.    Study Description     2D Echo with Color Flow and Doppler The study is technically difficult for diagnosis.Verbal consent was obtained from the patient to use Definity contrast in order to optimize the study.   Echocardiogram Findings     Left Ventricle Left ventricular systolic function is normal. Calculated EF = 69%. Estimated EF = 70%. Normal left ventricular cavity size noted. Left ventricular diastolic dysfunction is noted (grade II w/high LAP) consistent with pseudonormalization.      Right Ventricle Normal right ventricular cavity size and systolic function noted.      Left Atrium Normal left atrial size noted.      Right Atrium Normal right atrial size noted.      Aortic Valve The aortic valve is abnormal in structure. The valve exhibits sclerosis. Trace aortic valve regurgitation is present. No aortic valve stenosis is present.      Mitral Valve The mitral valve is grossly normal in structure. Trace mitral valve regurgitation is present. No significant mitral valve stenosis is present.      Tricuspid Valve The tricuspid valve is grossly normal. No tricuspid valve stenosis is present. Trace tricuspid valve regurgitation is present. Estimated right ventricular systolic pressure from tricuspid regurgitation is normal (<35 mmHg).      Pulmonic Valve The pulmonic valve is grossly normal in structure. There is no significant pulmonic valve stenosis present. There is no pulmonic valve regurgitation present.      Greater Vessels Mild dilation of the aortic root is present.      Pericardium There is no evidence of pericardial effusion.        Study Result     EXAMINATION: CT ANGIOGRAM CHEST W CONTRAST- 3/11/2019 10:24 AM CDT     HISTORY: Aortic disease, non-traumatic, known or suspect; I71.2-Thoracic  aortic aneurysm, without rupture     DOSE: 256 mGycm (Automatic exposure control technique was implemented in  an  effort to keep the radiation dose as low as possible without  compromising image quality)     REPORT: Spiral CTA of the chest was performed after administration of  intravenous contrast from the thoracic inlet through the upper abdomen.  Reconstructed 3-D, coronal and sagittal images were also reviewed.     Comparison: CTA chest 2/2/2018.     Review of lung windows demonstrates mild respiratory motion artifact,  minimal bibasilar atelectasis. No lung mass or infiltrate is identified.  There is no pneumothorax or pleural effusion. Soft tissue windows show a  homogeneous appearance of the visualized thyroid gland. The contrast  bolus is satisfactory. The pulmonary arteries are normal in caliber,  without evidence of pulmonary embolism. The thoracic aorta is ectatic,  there is aneurysmal dilation of the ascending aorta, with maximum  diameter of 4.1 cm, essentially unchanged, given differences in  measuring technique. There is no evidence of aneurysm rupture and no  dissection is identified. The arch level, the aorta has a maximum  diameter of 2.9 cm, the descending thoracic aorta has a maximum diameter  2.7 cm. Heart size is normal. Heavy atherosclerotic calcification is  noted within the LAD coronary artery distribution as before. No  intrathoracic lymphadenopathy is identified. Mild mucosal thickening  involving the distal esophagus may be related to esophagitis. The  visualized upper abdomen is unremarkable. Review of bone windows shows  extensive degenerative spurring throughout the thoracic spine, lower  cervical spine.     IMPRESSION:  1. Stable CT of the chest, with aneurysmal dilation of the ascending  aorta, with a maximum diameter 4.1 cm, the slight size difference  compared with the previous CT this most likely due to measuring  technique. There is no evidence of aneurysm rupture or dissection.  2. Heavy calcified atherosclerosis but plaque within the LAD coronary  artery distribution.  3. Mild esophageal  wall thickening distally may represent mild  esophagitis.      Study Result     CT angiography with 3D MIP images the chest with IV contrast     Indication: Aortic dz, non-traumatic, known or suspect; I71.2-Thoracic  aortic aneurysm, without rupture     Comparison: 03/11/2019     DOSE LENGTH PRODUCT: 339 mGy cm. Automated exposure control was also  utilized to decrease patient radiation dose.     Findings:     Stable ectasia of the ascending thoracic aorta measuring 4.1 cm  diameter. Moderate coronary artery calcification. Three-vessel aortic  arch with widely patent branch origins. No central pulmonary artery  filling defect. No pericardial effusion. Mild atherosclerotic narrowing  of the LEFT renal artery origin.     Central airways are clear. No consolidation or pleural effusion. No  suspicious pulmonary nodule or mass. No enlarged thoracic lymph nodes.  No acute findings in the upper abdomen. Thoracic spine degenerative  change. No acute osseous findings.     IMPRESSION:  Impression:     Stable ectasia of the ascending thoracic aorta measuring 4.1 cm  diameter.  This report was finalized on 03/11/2020 12:36 by Dr. Percy Riddle MD.     EXAM: CT chest angiography with 3D MIP images with IV contrast     INDICATION: Aortic disease, nontraumatic; I71.2-Thoracic aortic  aneurysm, without rupture     COMPARISON: 3/11/2020     DOSE LENGTH PRODUCT: 285 mGy cm. Automated exposure control was also  utilized to decrease patient radiation dose.     FINDINGS:     No change in ectasia of the ascending aorta measuring up to 4.1 cm  diameter. Three-vessel aortic arch with widely patent branch origins.  The aortic arch and descending thoracic aorta are normal caliber. Main  pulmonary trunk is nondilated. No pulmonary artery filling defect. No  pericardial effusion.     Central airways are clear. No consolidation or pleural effusion. No  suspicious pulmonary nodule or mass. No enlarged axillary,  supraclavicular, mediastinal, or  hilar lymph nodes.     Uniform thyroid. No acute chest wall soft tissue abnormality. Multilevel  thoracic spine degenerative change. No aggressive osseous lesions.     IMPRESSION:     Stable ectasia of the ascending aorta measuring 4.1 cm diameter.  This report was finalized on 04/21/2021 14:10 by Dr. Percy Riddle MD.        Diagnoses and all orders for this visit:    1. Thoracic aortic aneurysm without rupture (CMS/HCC) (Primary)  -     CT Angiogram Chest; Future    2. Tobacco abuse          Assessment/Plan     Independent review of the performed CT angiogram is notable for a 4.1 cm in the short axis in greatest dimension aneurysm with fusiform morphology.  No dissection.  It is noted that previously his ascending aortic aneurysm measured in reversed chronological order of 4.2, 4.2, and prior to that 4.3 cm in size.  This has been stable over all.      We discussed signs and symptoms of acute aortic pathology which he verbalizes understanding.    He is a smoker and has been counseled x4 minutes.      Patient's Body mass index is 23.89 kg/m². BMI is within normal parameters. No follow-up required.    RTC 1 year with CT angiogram.

## 2021-09-08 ENCOUNTER — OFFICE VISIT (OUTPATIENT)
Dept: CARDIOLOGY | Facility: CLINIC | Age: 76
End: 2021-09-08

## 2021-09-08 VITALS
HEART RATE: 77 BPM | HEIGHT: 72 IN | WEIGHT: 174 LBS | DIASTOLIC BLOOD PRESSURE: 80 MMHG | BODY MASS INDEX: 23.57 KG/M2 | SYSTOLIC BLOOD PRESSURE: 128 MMHG | OXYGEN SATURATION: 98 %

## 2021-09-08 DIAGNOSIS — Z86.73 HISTORY OF TIA (TRANSIENT ISCHEMIC ATTACK): ICD-10-CM

## 2021-09-08 DIAGNOSIS — I71.20 THORACIC AORTIC ANEURYSM WITHOUT RUPTURE (HCC): ICD-10-CM

## 2021-09-08 DIAGNOSIS — I48.0 PAROXYSMAL ATRIAL FIBRILLATION (HCC): ICD-10-CM

## 2021-09-08 DIAGNOSIS — Z01.818 PREOPERATIVE EVALUATION TO RULE OUT SURGICAL CONTRAINDICATION: ICD-10-CM

## 2021-09-08 DIAGNOSIS — E78.5 DYSLIPIDEMIA: ICD-10-CM

## 2021-09-08 DIAGNOSIS — I10 ESSENTIAL HYPERTENSION: Chronic | ICD-10-CM

## 2021-09-08 DIAGNOSIS — I25.10 CORONARY ARTERY DISEASE INVOLVING NATIVE CORONARY ARTERY OF NATIVE HEART WITHOUT ANGINA PECTORIS: Primary | Chronic | ICD-10-CM

## 2021-09-08 DIAGNOSIS — Z72.0 TOBACCO ABUSE: Chronic | ICD-10-CM

## 2021-09-08 PROCEDURE — 99214 OFFICE O/P EST MOD 30 MIN: CPT | Performed by: INTERNAL MEDICINE

## 2021-09-08 PROCEDURE — 93000 ELECTROCARDIOGRAM COMPLETE: CPT | Performed by: INTERNAL MEDICINE

## 2021-09-08 RX ORDER — PANTOPRAZOLE SODIUM 40 MG/1
40 TABLET, DELAYED RELEASE ORAL DAILY
Qty: 90 TABLET | Refills: 3 | Status: SHIPPED | OUTPATIENT
Start: 2021-09-08 | End: 2022-10-04 | Stop reason: SDUPTHER

## 2021-09-08 RX ORDER — METOPROLOL SUCCINATE 25 MG/1
TABLET, EXTENDED RELEASE ORAL
Qty: 45 TABLET | Refills: 3 | Status: SHIPPED | OUTPATIENT
Start: 2021-09-08 | End: 2022-10-04 | Stop reason: SDUPTHER

## 2021-09-08 RX ORDER — SIMVASTATIN 20 MG
20 TABLET ORAL NIGHTLY
Qty: 90 TABLET | Refills: 3 | Status: SHIPPED | OUTPATIENT
Start: 2021-09-08 | End: 2022-10-04 | Stop reason: SDUPTHER

## 2021-09-08 NOTE — PROGRESS NOTES
Reason for Visit: cardiovascular follow up.    HPI:  Solomon Perez is a 76 y.o. male is here today for follow-up for preoperative evaluation prior to total hip replacement with Dr Nj later this month.  His only complaint is pain in his hip.  He has been active doing yard and house work.  He denies any chest pain, palpitations, dizziness, syncope, PND, or orthopnea.  His blood pressure is reasonably well controlled.    Previous Cardiac Testing and Procedures:  - LHC (12/15/2010) 90% stenosis in the mid LAD just prior to the first diagonal branch, first septal  with 80% proximal stenosis, 50% mid RCA stenosis, PCI with 2.5 x 18 mm Promus ABRAHAN to mid LAD.,  EF 70%  - Exercise treadmill stress (02/07/2012) negative for ischemia  - Echo (05/12/2015) EF 60-65%, normal diastolic function  - Echo (11/14/2017) EF 55%, mild to moderate MR, mild AI  - Carotid ultrasound (11/14/2017) no hemodynamically significant carotid stenosis  - Event monitor (1/8/2018) sinus rhythm throughout, no significant pauses, arrhythmias, or events, no symptoms  - Lipid panel (8/1/2018) total cholesterol 139, HDL 59, LDL 69, triglycerides 56  - CTA chest (3/11/2019) ascending aortic dilation up to 4.1 cm  - CTA chest (3/11/2020) stable ectasia of the ascending thoracic aorta measuring 4.1 cm  -CTA chest (4/21/2021) stable ectasia of the ascending aorta measuring 4.1 cm    Patient Active Problem List   Diagnosis   • Coronary artery disease involving native coronary artery of native heart without angina pectoris   • Essential hypertension   • Transient cerebral ischemia   • Expressive dysphasia   • Thoracic aortic aneurysm without rupture (CMS/HCC)   • Dyslipidemia   • Paroxysmal atrial fibrillation (CMS/HCC)   • Transient visual loss of right eye   • Tobacco abuse   • Alcohol abuse   • History of TIA (transient ischemic attack)       Social History     Tobacco Use   • Smoking status: Current Every Day Smoker     Packs/day: 0.50      Years: 40.00     Pack years: 20.00     Types: Cigarettes     Start date: 1955   • Smokeless tobacco: Former User   • Tobacco comment: has quit several times up to 15 years, less than 1 ppd for a total of 40 years.    Substance Use Topics   • Alcohol use: Yes     Alcohol/week: 10.0 - 12.0 standard drinks     Types: 10 - 12 Cans of beer per week   • Drug use: No       Family History   Problem Relation Age of Onset   • Coronary artery disease Other    • Colon cancer Mother    • Heart attack Father    • Heart failure Father        The following portions of the patient's history were reviewed and updated as appropriate: allergies, current medications, past family history, past medical history, past social history, past surgical history and problem list.      Current Outpatient Medications:   •  apixaban (Eliquis) 5 MG tablet tablet, Take 1 tablet by mouth Every 12 (Twelve) Hours., Disp: 180 tablet, Rfl: 3  •  aspirin 81 MG tablet, Take 1 tablet by mouth Daily., Disp: 30 tablet, Rfl: 11  •  metoprolol succinate XL (TOPROL-XL) 25 MG 24 hr tablet, Take 1/2 tablet daily, Disp: 45 tablet, Rfl: 3  •  pantoprazole (PROTONIX) 40 MG EC tablet, Take 1 tablet by mouth Daily., Disp: 90 tablet, Rfl: 3  •  simvastatin (ZOCOR) 20 MG tablet, Take 1 tablet by mouth Every Night., Disp: 90 tablet, Rfl: 3  •  nitroglycerin (NITROSTAT) 0.4 MG SL tablet, Place 1 tablet under the tongue Every 5 (Five) Minutes As Needed for Chest Pain. Take no more than 3 doses in 15 minutes., Disp: 25 tablet, Rfl: 11    Review of Systems   Constitutional: Negative for chills and fever.   Cardiovascular: Negative for chest pain, irregular heartbeat, orthopnea, palpitations and paroxysmal nocturnal dyspnea.   Respiratory: Negative for cough and shortness of breath.    Skin: Negative for rash.   Musculoskeletal: Positive for joint pain.   Gastrointestinal: Negative for abdominal pain and heartburn.   Neurological: Negative for dizziness and numbness.  "      Objective   /80 (BP Location: Left arm, Patient Position: Sitting, Cuff Size: Adult)   Pulse 77   Ht 182.9 cm (72\")   Wt 78.9 kg (174 lb)   SpO2 98%   BMI 23.60 kg/m²   Constitutional:       Appearance: Well-developed.   HENT:      Head: Normocephalic and atraumatic.   Pulmonary:      Effort: Pulmonary effort is normal.      Breath sounds: Normal breath sounds.   Cardiovascular:      Normal rate. Regular rhythm.      Murmurs: There is no murmur.      No gallop. No click.   Edema:     Peripheral edema absent.   Skin:     General: Skin is warm and dry.   Neurological:      Mental Status: Alert and oriented to person, place, and time.         ECG 12 Lead    Date/Time: 9/8/2021 3:09 PM  Performed by: Shawn Resendiz MD  Authorized by: Shawn Resendiz MD   Comparison: compared with previous ECG from 1/28/2021  Similar to previous ECG  Rhythm: sinus rhythm  Rate: normal    Clinical impression: normal ECG              ICD-10-CM ICD-9-CM   1. Coronary artery disease involving native coronary artery of native heart without angina pectoris  I25.10 414.01   2. Paroxysmal atrial fibrillation (CMS/HCC)  I48.0 427.31   3. History of TIA (transient ischemic attack)  Z86.73 V12.54   4. Thoracic aortic aneurysm without rupture (CMS/HCC)  I71.2 441.2   5. Essential hypertension  I10 401.9   6. Dyslipidemia  E78.5 272.4   7. Tobacco abuse  Z72.0 305.1   8. Preoperative evaluation to rule out surgical contraindication  Z01.818 V72.83         Assessment/Plan:  1.  Coronary artery disease: History of PCI to LAD in 2010.  Patient remains asymptomatic. Continue aspirin, metoprolol, and simvastatin.     2.  Questionable history of atrial fibrillation: Questionable history of atrial fibrillation  that was the suspected cause of his TIA, but there is never been any EKG or monitor study that is available showing atrial fibrillation.  Normal sinus rhythm on EKG today with no palpitations.  Continue  Eliquis.     3.  History " of TIA: Concern for atrial fibrillation as possible etiology as mentioned above but no definitive evidence.  Continue Eliquis.         4.  Thoracic aortic aneurysm: Stable on CTA chest from 4/21/2021, measuring 4.1 cm.  Follows with Dr. Osman.     5.  Essential hypertension: Blood pressure remains well controlled.  Continue current therapy.     6.  Dyslipidemia:  Continue simvastatin.  Plan to discuss repeat lipid panel at follow-up.     7.  Tobacco abuse: Solomon Perez  reports that he has been smoking cigarettes. He started smoking about 66 years ago. He has a 20.00 pack-year smoking history. He has quit using smokeless tobacco.. I have educated him on the risk of diseases from using tobacco products such as cancer, COPD and heart disease.  I advised him to quit and he is not willing to quit.  I spent 3.5 minutes counseling the patient.    8.  Preoperative evaluation: Patient is a low to intermediate risk surgical candidate from a cardiac standpoint.  He is asymptomatic on good medical therapy.  His functional capacity is greater than 4 METS.  No further cardiac testing is indicated prior to surgery.  It is acceptable for him to hold Eliquis for 48 to 72 hours prior to the surgery and resume when safe afterwards.  He should continue aspirin and other medical therapy throughout the perioperative period.

## 2021-10-04 LAB
INR BLD: 3.06 (ref 0.88–1.18)
PROTHROMBIN TIME: 31.1 SEC (ref 12–14.6)

## 2021-10-06 ENCOUNTER — TELEPHONE (OUTPATIENT)
Dept: CARDIOLOGY | Facility: CLINIC | Age: 76
End: 2021-10-06

## 2021-10-06 LAB — INR PPP: 4.4

## 2021-10-06 NOTE — TELEPHONE ENCOUNTER
Patients daughter called and stated home health came to check his pt/inr today and it was 4.3. she was advised to give him some spinach, so they made him a protein shake with spinach in it.  His ortho office, dr. Nj advised him not to take the coumadin after getting the pt/inr results.    Patients daughter wants to verify this with you and see if he needs to hold coumadin or not since he is currently still off his eliquis do to hip replacement surgery.    Please advise.  Thank  you

## 2021-10-07 ENCOUNTER — ANTICOAGULATION VISIT (OUTPATIENT)
Dept: CARDIOLOGY | Facility: CLINIC | Age: 76
End: 2021-10-07

## 2021-10-07 ENCOUNTER — TELEPHONE (OUTPATIENT)
Dept: CARDIOLOGY | Facility: CLINIC | Age: 76
End: 2021-10-07

## 2021-10-07 LAB — INR PPP: 1.7

## 2021-10-07 NOTE — TELEPHONE ENCOUNTER
Advised daughter ok to hold warfarin until INR less than 3 and recheck INR as advised by Dr. Nj. Spoke to Linda Ramirez RN this morning. She has requested records (not forwarded to us at discharge). She states Dr. Nj wants him to take warfarin until 10/14/21 and then resume Eliquis. She will determine if he was told to repeat INR and when. Daughter wants us to regulate INR until he resumes Eliquis.

## 2021-10-07 NOTE — TELEPHONE ENCOUNTER
Call received from [facility] this morning r/t INR.  Call received yesterday with note in chart from [facility] office stating patient was told to eat spinach and hold Coumadin per Dr. Nj's office.  Call returned today x 2, no answer, left message requesting return call to my direct line.  Call placed to Dr. Nj's office to f/u as patient DC information was not received for monitoring.  Per conversation w/APRN it was noted that Sx was actually on 9/28 w/DC 9/30.  Explained that no DC information was received.  Per conversation and her review of hospital notes it was stated that patient INR was 1.7 at DC with instruction per Dr. Velazquez to take Coumadin 5mg PO daily and repeat INR testing on M/W/F.  Patient was to remain on Coumadin x 2 weeks. Call was placed to patient last week to f/u as DC information had not been received and there was no call from  for set-up.  Patient was in distress and asked that I speak to his son.  Per conversation w/son it was noted that the patient was still hospitalized and would be until Thursday.  Request received from Dr. Nj's office stated Sx was for 9/21.  Call to NATALIE Castaneda to f/u.  Will await return call from  and place a call directly to patient/family for f/u.  Per conversation w/daughter, patient had protein shake yesterday w/spinach in it and held dose.  Patient will resume today at 2.5mg tonight with repeat INR per  in AM.  Direct line and cell given to patient's daughter for f/u.  Per Dr. Nj's request, INR will be monitored M/W/F until Eliquis can be resumed (this coming Tuesday, 10/12/21, if INR less than 2.0).

## 2021-10-08 ENCOUNTER — ANTICOAGULATION VISIT (OUTPATIENT)
Dept: CARDIOLOGY | Facility: CLINIC | Age: 76
End: 2021-10-08

## 2021-10-08 LAB — INR PPP: 1.9

## 2021-10-11 ENCOUNTER — ANTICOAGULATION VISIT (OUTPATIENT)
Dept: CARDIOLOGY | Facility: CLINIC | Age: 76
End: 2021-10-11

## 2021-10-11 LAB — INR PPP: 1.5

## 2021-10-12 ENCOUNTER — ANTICOAGULATION VISIT (OUTPATIENT)
Dept: CARDIOLOGY | Facility: CLINIC | Age: 76
End: 2021-10-12

## 2021-10-12 LAB — INR PPP: 1.5

## 2022-03-23 ENCOUNTER — OFFICE VISIT (OUTPATIENT)
Dept: CARDIOLOGY | Facility: CLINIC | Age: 77
End: 2022-03-23

## 2022-03-23 VITALS
SYSTOLIC BLOOD PRESSURE: 130 MMHG | OXYGEN SATURATION: 98 % | BODY MASS INDEX: 23.7 KG/M2 | HEART RATE: 64 BPM | HEIGHT: 72 IN | DIASTOLIC BLOOD PRESSURE: 80 MMHG | WEIGHT: 175 LBS

## 2022-03-23 DIAGNOSIS — I71.20 THORACIC AORTIC ANEURYSM WITHOUT RUPTURE: ICD-10-CM

## 2022-03-23 DIAGNOSIS — I48.0 PAROXYSMAL ATRIAL FIBRILLATION: ICD-10-CM

## 2022-03-23 DIAGNOSIS — Z72.0 TOBACCO ABUSE: Chronic | ICD-10-CM

## 2022-03-23 DIAGNOSIS — Z86.73 HISTORY OF TIA (TRANSIENT ISCHEMIC ATTACK): ICD-10-CM

## 2022-03-23 DIAGNOSIS — I10 ESSENTIAL HYPERTENSION: Chronic | ICD-10-CM

## 2022-03-23 DIAGNOSIS — E78.5 DYSLIPIDEMIA: ICD-10-CM

## 2022-03-23 DIAGNOSIS — I25.10 CORONARY ARTERY DISEASE INVOLVING NATIVE CORONARY ARTERY OF NATIVE HEART WITHOUT ANGINA PECTORIS: Primary | Chronic | ICD-10-CM

## 2022-03-23 PROCEDURE — 99214 OFFICE O/P EST MOD 30 MIN: CPT | Performed by: INTERNAL MEDICINE

## 2022-03-23 RX ORDER — HYDROCODONE BITARTRATE AND ACETAMINOPHEN 10; 325 MG/1; MG/1
TABLET ORAL
COMMUNITY
Start: 2022-03-10 | End: 2022-10-04 | Stop reason: ALTCHOICE

## 2022-03-23 NOTE — PROGRESS NOTES
Reason for Visit: cardiovascular follow up.    HPI:  Solomon Perez is a 76 y.o. male is here today for follow-up.  He had hip replacement a couple of months ago.  He has recovered well and he denies any hip pain.  He wished he had had the surgery earlier.  He denies any chest pain, palpitations, dizziness, syncope, PND, or orthopnea.  His blood pressure is normal today.  He does not check it much at home.  He has not been exercising much during the winter but did do rehab after surgery.  He continues to smoke and does not have any interest in quitting.  He is scheduled to get repeat imaging of his aorta in April.    Previous Cardiac Testing and Procedures:  - C (12/15/2010) 90% stenosis in the mid LAD just prior to the first diagonal branch, first septal  with 80% proximal stenosis, 50% mid RCA stenosis, PCI with 2.5 x 18 mm Promus ABRAHAN to mid LAD.,  EF 70%  - Exercise treadmill stress (02/07/2012) negative for ischemia  - Echo (05/12/2015) EF 60-65%, normal diastolic function  - Echo (11/14/2017) EF 55%, mild to moderate MR, mild AI  - Carotid ultrasound (11/14/2017) no hemodynamically significant carotid stenosis  - Event monitor (1/8/2018) sinus rhythm throughout, no significant pauses, arrhythmias, or events, no symptoms  - Lipid panel (8/1/2018) total cholesterol 139, HDL 59, LDL 69, triglycerides 56  - CTA chest (3/11/2019) ascending aortic dilation up to 4.1 cm  - CTA chest (3/11/2020) stable ectasia of the ascending thoracic aorta measuring 4.1 cm  - CTA chest (4/21/2021) stable ectasia of the ascending aorta measuring 4.1 cm    Patient Active Problem List   Diagnosis   • Coronary artery disease involving native coronary artery of native heart without angina pectoris   • Essential hypertension   • Transient cerebral ischemia   • Expressive dysphasia   • Thoracic aortic aneurysm without rupture (HCC)   • Dyslipidemia   • Paroxysmal atrial fibrillation (HCC)   • Transient visual loss of right  eye   • Tobacco abuse   • Alcohol abuse   • History of TIA (transient ischemic attack)       Social History     Tobacco Use   • Smoking status: Current Every Day Smoker     Packs/day: 0.50     Years: 40.00     Pack years: 20.00     Types: Cigarettes     Start date: 1955   • Smokeless tobacco: Former User   • Tobacco comment: has quit several times up to 15 years, less than 1 ppd for a total of 40 years.    Substance Use Topics   • Alcohol use: Yes     Alcohol/week: 10.0 - 12.0 standard drinks     Types: 10 - 12 Cans of beer per week   • Drug use: No       Family History   Problem Relation Age of Onset   • Coronary artery disease Other    • Colon cancer Mother    • Heart attack Father    • Heart failure Father        The following portions of the patient's history were reviewed and updated as appropriate: allergies, current medications, past family history, past medical history, past social history, past surgical history and problem list.      Current Outpatient Medications:   •  apixaban (Eliquis) 5 MG tablet tablet, Take 1 tablet by mouth Every 12 (Twelve) Hours., Disp: 180 tablet, Rfl: 3  •  aspirin 81 MG tablet, Take 1 tablet by mouth Daily., Disp: 30 tablet, Rfl: 11  •  HYDROcodone-acetaminophen (NORCO)  MG per tablet, , Disp: , Rfl:   •  metoprolol succinate XL (TOPROL-XL) 25 MG 24 hr tablet, Take 1/2 tablet daily, Disp: 45 tablet, Rfl: 3  •  nitroglycerin (NITROSTAT) 0.4 MG SL tablet, Place 1 tablet under the tongue Every 5 (Five) Minutes As Needed for Chest Pain. Take no more than 3 doses in 15 minutes., Disp: 25 tablet, Rfl: 11  •  pantoprazole (PROTONIX) 40 MG EC tablet, Take 1 tablet by mouth Daily., Disp: 90 tablet, Rfl: 3  •  simvastatin (ZOCOR) 20 MG tablet, Take 1 tablet by mouth Every Night., Disp: 90 tablet, Rfl: 3    Review of Systems   Constitutional: Negative for chills and fever.   Cardiovascular: Negative for chest pain and paroxysmal nocturnal dyspnea.   Respiratory: Negative for cough  "and shortness of breath.    Skin: Negative for rash.   Musculoskeletal: Positive for back pain.   Gastrointestinal: Negative for abdominal pain and heartburn.   Neurological: Negative for dizziness and numbness.       Objective   /80 (BP Location: Right arm, Patient Position: Sitting, Cuff Size: Adult)   Pulse 64   Ht 182.9 cm (72.01\")   Wt 79.4 kg (175 lb)   SpO2 98%   BMI 23.73 kg/m²   Constitutional:       Appearance: Well-developed.   HENT:      Head: Normocephalic and atraumatic.   Pulmonary:      Effort: Pulmonary effort is normal.      Breath sounds: Normal breath sounds.   Cardiovascular:      Normal rate. Regular rhythm.      Murmurs: There is no murmur.      No gallop. No click.   Skin:     General: Skin is warm and dry.   Neurological:      Mental Status: Alert and oriented to person, place, and time.       Procedures      ICD-10-CM ICD-9-CM   1. Coronary artery disease involving native coronary artery of native heart without angina pectoris  I25.10 414.01   2. Paroxysmal atrial fibrillation (HCC)  I48.0 427.31   3. History of TIA (transient ischemic attack)  Z86.73 V12.54   4. Thoracic aortic aneurysm without rupture (HCC)  I71.2 441.2   5. Essential hypertension  I10 401.9   6. Dyslipidemia  E78.5 272.4   7. Tobacco abuse  Z72.0 305.1         Assessment/Plan:  1. Coronary artery disease: History of PCI to LAD in 2010.  No chest pain symptoms.  Continue aspirin, metoprolol, and simvastatin.     2.  Questionable history of atrial fibrillation: History of TIA with atrial fibrillation felt to be a possible etiology but no definitive evidence to confirm this.  Heart rate remains regular on exam today.  Continue anticoagulation with Eliquis.      3.  History of TIA: Concern for atrial fibrillation as possible etiology.   Continue Eliquis.        4.  Thoracic aortic aneurysm: Stable on CTA chest from 4/21/2021, at 4.1 cm.  Follows with Dr. Osman with repeat imaging scheduled for April.     5. "  Essential hypertension:  Blood pressure remains reasonably well controlled.  Continue metoprolol.     6.  Dyslipidemia: Continue simvastatin and order repeat lipid panel.     7.  Tobacco abuse: Solomon Perez  reports that he has been smoking cigarettes. He started smoking about 67 years ago. He has a 20.00 pack-year smoking history. He has quit using smokeless tobacco.. I have educated him on the risk of diseases from using tobacco products such as cancer, COPD and heart disease.  I advised him to quit and he is not willing to quit.  I spent 4 minutes counseling the patient.

## 2022-04-20 ENCOUNTER — HOSPITAL ENCOUNTER (OUTPATIENT)
Dept: CT IMAGING | Facility: HOSPITAL | Age: 77
Discharge: HOME | End: 2022-04-20
Admitting: NURSE PRACTITIONER

## 2022-04-20 ENCOUNTER — OFFICE VISIT (OUTPATIENT)
Dept: CARDIAC SURGERY | Facility: CLINIC | Age: 77
End: 2022-04-20

## 2022-04-20 VITALS
DIASTOLIC BLOOD PRESSURE: 70 MMHG | HEART RATE: 77 BPM | OXYGEN SATURATION: 98 % | HEIGHT: 72 IN | BODY MASS INDEX: 23.62 KG/M2 | WEIGHT: 174.4 LBS | SYSTOLIC BLOOD PRESSURE: 120 MMHG

## 2022-04-20 DIAGNOSIS — I71.20 THORACIC AORTIC ANEURYSM WITHOUT RUPTURE: ICD-10-CM

## 2022-04-20 DIAGNOSIS — I71.20 THORACIC AORTIC ANEURYSM WITHOUT RUPTURE: Primary | ICD-10-CM

## 2022-04-20 LAB — CREAT BLDA-MCNC: 1 MG/DL (ref 0.6–1.3)

## 2022-04-20 PROCEDURE — 82565 ASSAY OF CREATININE: CPT

## 2022-04-20 PROCEDURE — 71275 CT ANGIOGRAPHY CHEST: CPT

## 2022-04-20 PROCEDURE — 99213 OFFICE O/P EST LOW 20 MIN: CPT | Performed by: THORACIC SURGERY (CARDIOTHORACIC VASCULAR SURGERY)

## 2022-04-20 PROCEDURE — 0 IOPAMIDOL PER 1 ML: Performed by: NURSE PRACTITIONER

## 2022-04-20 RX ORDER — CIPROFLOXACIN 500 MG/1
TABLET, FILM COATED ORAL
COMMUNITY
Start: 2022-04-15 | End: 2022-10-04 | Stop reason: ALTCHOICE

## 2022-04-20 RX ADMIN — IOPAMIDOL 100 ML: 755 INJECTION, SOLUTION INTRAVENOUS at 11:27

## 2022-04-22 DIAGNOSIS — I71.20 THORACIC AORTIC ANEURYSM WITHOUT RUPTURE: Primary | ICD-10-CM

## 2022-05-02 DIAGNOSIS — I71.20 THORACIC AORTIC ANEURYSM WITHOUT RUPTURE: Primary | ICD-10-CM

## 2022-05-30 NOTE — PROGRESS NOTES
Subjective   Patient ID: Solomon Perez is a 77 y.o. male who is here for follow-up of a known aneurysm.   Chief Complaint   Patient presents with   • Thoracic Aneurysm     Pt is here for follow up w/CT      History of Present Illness    Since his last office visit, he has had his hip replaced.  He is recovering well just slow.  He does continue to see Dr. Resendiz with no new recommendations. No chest pain nor shortness of breath.  Unfortunately he does still smoking 1/2-1 pack of cigarettes per day.    He denies unintended weight loss, hoarseness of voice, chest pain, hemoptysis, history of pneumonia, or cough.    The following portions of the patient's history were reviewed and updated as appropriate: allergies, current medications, past family history, past medical history, past social history, past surgical history and problem list.           Objective   Physical Exam   Constitutional: He is oriented to person, place, and time. He appears well-developed.   HENT:   Head: Normocephalic and atraumatic.   Eyes: Pupils are equal, round, and reactive to light.   Neck: No JVD present. No tracheal deviation present. No thyromegaly present.   Cardiovascular: Normal rate, regular rhythm and normal heart sounds. Exam reveals no gallop and no friction rub.   No murmur heard.  Pulmonary/Chest: Effort normal and breath sounds normal. No respiratory distress. He has no wheezes. He has no rales. He exhibits no tenderness.   Abdominal: Soft. He exhibits no distension. There is no abdominal tenderness.   Musculoskeletal: Normal range of motion.   Lymphadenopathy:     He has no cervical adenopathy.   Neurological: He is alert and oriented to person, place, and time. No cranial nerve deficit.   Skin: Skin is warm and dry.       CT ANGIOGRAM CHEST W CONTRAST- 2/2/2018 10:13 AM CST      HISTORY: thoracic aortic aneurysm; I71.2-Thoracic aortic aneurysm,  without rupture      COMPARISON: None.      DOSE LENGTH PRODUCT: 327 mGy cm.  Automated exposure control was also  utilized to decrease patient radiation dose.     TECHNIQUE: Helical tomographic images of the chest were obtained after  the administration of intravenous contrast following angiogram protocol.  Additionally, 3D and multiplanar reformatted images were provided.        FINDINGS:    Pulmonary arteries: There is adequate enhancement of the pulmonary  arteries to evaluate for central and segmental pulmonary emboli. There  are no filling defects within the main, lobar, segmental or visualized  subsegmental pulmonary arteries.    .      Aorta and great vessels: The ascending aorta is prominent and is 43 mm  in diameter. There is no evidence of dissection. The great vessels arise  from aortic arch in a normal manner. Descending thoracic aorta is  unremarkable.. Coronary calcifications noted..     Visualized neck base: The imaged portion of the base of the neck appears  unremarkable.      Lungs: The lungs are clear. There is no mass, worrisome nodule, or  consolidation. No pleural effusion is seen. The trachea and bronchial  tree are patent.      Heart: The heart is normal in size. There is no pericardial effusion.      Mediastinum and lymph nodes: No enlarged mediastinal, hilar, or axillary  lymph nodes are present.      Skeletal and soft tissues: The osseous structures of the thorax and  surrounding soft tissues demonstrate no acute process.     Upper abdomen: The imaged portion of the upper abdomen demonstrates no  acute process.      IMPRESSION:  1. The ascending aorta is 43 mm in diameter. There is no evidence of  dissection.  2. Otherwise is exam is unremarkable.        This report was finalized on 02/02/2018 10:32 by Dr. Castro Mack MD.    Study Description     2D Echo with Color Flow and Doppler The study is technically difficult for diagnosis.Verbal consent was obtained from the patient to use Definity contrast in order to optimize the study.   Echocardiogram Findings      Left Ventricle Left ventricular systolic function is normal. Calculated EF = 69%. Estimated EF = 70%. Normal left ventricular cavity size noted. Left ventricular diastolic dysfunction is noted (grade II w/high LAP) consistent with pseudonormalization.      Right Ventricle Normal right ventricular cavity size and systolic function noted.      Left Atrium Normal left atrial size noted.      Right Atrium Normal right atrial size noted.      Aortic Valve The aortic valve is abnormal in structure. The valve exhibits sclerosis. Trace aortic valve regurgitation is present. No aortic valve stenosis is present.      Mitral Valve The mitral valve is grossly normal in structure. Trace mitral valve regurgitation is present. No significant mitral valve stenosis is present.      Tricuspid Valve The tricuspid valve is grossly normal. No tricuspid valve stenosis is present. Trace tricuspid valve regurgitation is present. Estimated right ventricular systolic pressure from tricuspid regurgitation is normal (<35 mmHg).      Pulmonic Valve The pulmonic valve is grossly normal in structure. There is no significant pulmonic valve stenosis present. There is no pulmonic valve regurgitation present.      Greater Vessels Mild dilation of the aortic root is present.      Pericardium There is no evidence of pericardial effusion.        Study Result     EXAMINATION: CT ANGIOGRAM CHEST W CONTRAST- 3/11/2019 10:24 AM CDT     HISTORY: Aortic disease, non-traumatic, known or suspect; I71.2-Thoracic  aortic aneurysm, without rupture     DOSE: 256 mGycm (Automatic exposure control technique was implemented in  an effort to keep the radiation dose as low as possible without  compromising image quality)     REPORT: Spiral CTA of the chest was performed after administration of  intravenous contrast from the thoracic inlet through the upper abdomen.  Reconstructed 3-D, coronal and sagittal images were also reviewed.     Comparison: CTA chest  2/2/2018.     Review of lung windows demonstrates mild respiratory motion artifact,  minimal bibasilar atelectasis. No lung mass or infiltrate is identified.  There is no pneumothorax or pleural effusion. Soft tissue windows show a  homogeneous appearance of the visualized thyroid gland. The contrast  bolus is satisfactory. The pulmonary arteries are normal in caliber,  without evidence of pulmonary embolism. The thoracic aorta is ectatic,  there is aneurysmal dilation of the ascending aorta, with maximum  diameter of 4.1 cm, essentially unchanged, given differences in  measuring technique. There is no evidence of aneurysm rupture and no  dissection is identified. The arch level, the aorta has a maximum  diameter of 2.9 cm, the descending thoracic aorta has a maximum diameter  2.7 cm. Heart size is normal. Heavy atherosclerotic calcification is  noted within the LAD coronary artery distribution as before. No  intrathoracic lymphadenopathy is identified. Mild mucosal thickening  involving the distal esophagus may be related to esophagitis. The  visualized upper abdomen is unremarkable. Review of bone windows shows  extensive degenerative spurring throughout the thoracic spine, lower  cervical spine.     IMPRESSION:  1. Stable CT of the chest, with aneurysmal dilation of the ascending  aorta, with a maximum diameter 4.1 cm, the slight size difference  compared with the previous CT this most likely due to measuring  technique. There is no evidence of aneurysm rupture or dissection.  2. Heavy calcified atherosclerosis but plaque within the LAD coronary  artery distribution.  3. Mild esophageal wall thickening distally may represent mild  esophagitis.      Study Result     CT angiography with 3D MIP images the chest with IV contrast     Indication: Aortic dz, non-traumatic, known or suspect; I71.2-Thoracic  aortic aneurysm, without rupture     Comparison: 03/11/2019     DOSE LENGTH PRODUCT: 339 mGy cm. Automated exposure  control was also  utilized to decrease patient radiation dose.     Findings:     Stable ectasia of the ascending thoracic aorta measuring 4.1 cm  diameter. Moderate coronary artery calcification. Three-vessel aortic  arch with widely patent branch origins. No central pulmonary artery  filling defect. No pericardial effusion. Mild atherosclerotic narrowing  of the LEFT renal artery origin.     Central airways are clear. No consolidation or pleural effusion. No  suspicious pulmonary nodule or mass. No enlarged thoracic lymph nodes.  No acute findings in the upper abdomen. Thoracic spine degenerative  change. No acute osseous findings.     IMPRESSION:  Impression:     Stable ectasia of the ascending thoracic aorta measuring 4.1 cm  diameter.  This report was finalized on 03/11/2020 12:36 by Dr. Percy Riddle MD.     EXAM: CT chest angiography with 3D MIP images with IV contrast     INDICATION: Aortic disease, nontraumatic; I71.2-Thoracic aortic  aneurysm, without rupture     COMPARISON: 3/11/2020     DOSE LENGTH PRODUCT: 285 mGy cm. Automated exposure control was also  utilized to decrease patient radiation dose.     FINDINGS:     No change in ectasia of the ascending aorta measuring up to 4.1 cm  diameter. Three-vessel aortic arch with widely patent branch origins.  The aortic arch and descending thoracic aorta are normal caliber. Main  pulmonary trunk is nondilated. No pulmonary artery filling defect. No  pericardial effusion.     Central airways are clear. No consolidation or pleural effusion. No  suspicious pulmonary nodule or mass. No enlarged axillary,  supraclavicular, mediastinal, or hilar lymph nodes.     Uniform thyroid. No acute chest wall soft tissue abnormality. Multilevel  thoracic spine degenerative change. No aggressive osseous lesions.     IMPRESSION:     Stable ectasia of the ascending aorta measuring 4.1 cm diameter.  This report was finalized on 04/21/2021 14:10 by Dr. Percy Riddle MD.    Study  Result    Narrative & Impression   EXAM/TECHNIQUE: CT chest angiography with 3D MIP images with IV contrast     INDICATION: Aneurysm; I71.2-Thoracic aortic aneurysm, without rupture     COMPARISON: 4/21/2021     DLP: 305 mGy cm. Automated exposure control was also utilized to  decrease patient radiation dose.     FINDINGS:     No change in the patient is seen aorta measuring 4.1 cm diameter. Mild  to moderate atherosclerotic stenosis of the LEFT axillary artery. Heavy  coronary calcification. No pericardial effusion. No central pulmonary  artery filling defect.     The central airways are clear. No consolidation or suspicious  groundglass opacities. No pleural effusion or pleural nodularity. No  suspicious noncalcified pulmonary nodule. No enlarged axillary,  supraclavicular, or mediastinal lymph nodes.     No large thyroid nodule. No acute chest wall soft tissue abnormality.  Visualized portion of the upper abdomen is unremarkable. No acute  osseous finding. No acute osseous finding. Degenerative change in the  thoracic spine.     IMPRESSION:     No change in ectasia of ascending aorta measuring 4.1 cm diameter.  This report was finalized on 04/20/2022 11:47 by Dr. Percy Riddle MD.         Diagnoses and all orders for this visit:    1. Thoracic aortic aneurysm without rupture (HCC) (Primary)          Assessment & Plan     Independent review of the performed CT angiogram is notable for a 4.2 cm in the short axis in greatest dimension aneurysm with fusiform morphology.  No dissection.  It is noted that previously his ascending aortic aneurysm measured in reversed chronological order of 4.1, 4.2, 4.2, and prior to that 4.3 cm in size.  This has been stable overall.      We discussed signs and symptoms of acute aortic pathology which he verbalizes understanding.    He is a smoker and has been counseled x7 minutes.      Patient's Body mass index is 23.65 kg/m². BMI is within normal parameters. No follow-up  required.    RTC 1 year with CT angiogram.      Many thanks for the opportunity to care for your patient.    I will continue to keep you apprised of provided care as it ensues.

## 2022-10-04 ENCOUNTER — OFFICE VISIT (OUTPATIENT)
Dept: CARDIOLOGY | Facility: CLINIC | Age: 77
End: 2022-10-04

## 2022-10-04 ENCOUNTER — TELEPHONE (OUTPATIENT)
Dept: CARDIOLOGY | Facility: CLINIC | Age: 77
End: 2022-10-04

## 2022-10-04 VITALS
WEIGHT: 169.6 LBS | HEART RATE: 87 BPM | SYSTOLIC BLOOD PRESSURE: 124 MMHG | OXYGEN SATURATION: 99 % | HEIGHT: 72 IN | BODY MASS INDEX: 22.97 KG/M2 | DIASTOLIC BLOOD PRESSURE: 70 MMHG

## 2022-10-04 DIAGNOSIS — I10 ESSENTIAL HYPERTENSION: Chronic | ICD-10-CM

## 2022-10-04 DIAGNOSIS — I25.10 CORONARY ARTERY DISEASE INVOLVING NATIVE CORONARY ARTERY OF NATIVE HEART WITHOUT ANGINA PECTORIS: Primary | Chronic | ICD-10-CM

## 2022-10-04 DIAGNOSIS — E78.5 DYSLIPIDEMIA: ICD-10-CM

## 2022-10-04 DIAGNOSIS — H53.121 TRANSIENT VISUAL LOSS OF RIGHT EYE: ICD-10-CM

## 2022-10-04 DIAGNOSIS — Z86.73 HISTORY OF TIA (TRANSIENT ISCHEMIC ATTACK): ICD-10-CM

## 2022-10-04 DIAGNOSIS — F10.10 ALCOHOL ABUSE: ICD-10-CM

## 2022-10-04 DIAGNOSIS — Z72.0 TOBACCO ABUSE: Chronic | ICD-10-CM

## 2022-10-04 DIAGNOSIS — I48.0 PAROXYSMAL ATRIAL FIBRILLATION: ICD-10-CM

## 2022-10-04 PROBLEM — I71.20 THORACIC AORTIC ANEURYSM WITHOUT RUPTURE: Chronic | Status: ACTIVE | Noted: 2018-03-14

## 2022-10-04 PROCEDURE — 99214 OFFICE O/P EST MOD 30 MIN: CPT | Performed by: NURSE PRACTITIONER

## 2022-10-04 PROCEDURE — 93000 ELECTROCARDIOGRAM COMPLETE: CPT | Performed by: NURSE PRACTITIONER

## 2022-10-04 RX ORDER — SIMVASTATIN 20 MG
20 TABLET ORAL NIGHTLY
Qty: 90 TABLET | Refills: 3 | Status: SHIPPED | OUTPATIENT
Start: 2022-10-04

## 2022-10-04 RX ORDER — METOPROLOL SUCCINATE 25 MG/1
TABLET, EXTENDED RELEASE ORAL
Qty: 45 TABLET | Refills: 3 | Status: SHIPPED | OUTPATIENT
Start: 2022-10-04

## 2022-10-04 RX ORDER — PANTOPRAZOLE SODIUM 40 MG/1
40 TABLET, DELAYED RELEASE ORAL DAILY
Qty: 90 TABLET | Refills: 3 | Status: SHIPPED | OUTPATIENT
Start: 2022-10-04

## 2022-10-04 NOTE — TELEPHONE ENCOUNTER
Called [facility] per your request. Notified, Ashlyn, that you suggested stopping eliquis but would like him to restart asa due to LAD stent. She stated she would notify Dr. Bowden that he would be fine with your recommendations.

## 2022-10-04 NOTE — ASSESSMENT & PLAN NOTE
Encouraged him to limit to 2 drinks per day or none. Discussed negative effects of drinking in excess.

## 2022-10-04 NOTE — ASSESSMENT & PLAN NOTE
Encouraged him to see his eye doctor and if no abnormality will need to refer to neurology given continued transient loss of vision in R eye.

## 2022-10-04 NOTE — ASSESSMENT & PLAN NOTE
Unknown control on simvastatin. Encouraged him to see his PCP at least yearly for a Wellness visit and labs.

## 2022-10-04 NOTE — ASSESSMENT & PLAN NOTE
Paroxysmal, maintaining sinus rhythm. Ok to hold Eliquis for colonoscopy but needs to stay on aspirin 81 mg daily. Discussed increased risk of occurrence especially with continued excessive alcohol consumption.

## 2022-10-04 NOTE — ASSESSMENT & PLAN NOTE
Continues to have intermittently. Advised him to see eye doctor again and if no abnormality will refer to neurology for further evaluation. May need to repeat carotid US as well - had mild plaquing per 11/2017 carotid US.

## 2022-10-04 NOTE — ASSESSMENT & PLAN NOTE
Remains stable. Continue present therapy. Encouraged healthy lifestyle. Needs to stay on aspirin if at all possible given LAD stent - will notify Dr. Bowden today and advise patient whether to restart aspirin.

## 2022-10-04 NOTE — PROGRESS NOTES
Encounter Date:10/04/2022  Chief Complaint:   Subjective    Solomon Perez is a 77 y.o. male who presents to [facility] [place] today for six month cardiac follow-up. He rescheduled his 9/28/22 f/u with Dr. Resendiz due to being out of town riding horses.      History of Present Illness   HPI     Coronary Artery Disease      Additional comments: No chest discomfort or anything similar to previous angina - hx anterior MI with PCI LAD 2010. Scheduled for colonoscopy Thursday and stopped aspirin Friday and Eliquis today.              Hypertension      Additional comments: Doesn't check at home. Has chronic headache daily on R side of head - R eye and nose run. Has seen ENT. Has some dizziness sometimes - usually when he stands up. His vision goes black in his R eye about once a week for 10 minutes for the last year or so. He saw his eye doctor in December 2021 and they didn't see anything wrong - hasn't seen neurology.              Atrial Fibrillation      Additional comments: Paroxysmal. No palpitations. Hx TIA. Is able to feel when he is in afib. No bleeding on Eliquis (started holding today for colonoscopy Thursday).               Follow-up      Additional comments: 6 MO FU              cardiac clearance      Additional comments: Colonoscopy thurs w/ dr keyes at [facility]              Hyperlipidemia      Additional comments: Doesn't think he got lipid panel done as ordered by Dr. Resendiz at last visit. Has only been seeing his PCP as needed.          Last edited by Samantha Castaneda APRN on 10/4/2022 11:34 AM. (History)        History: Past medical, surgical, family, and social history reviewed.    Outpatient Medications Marked as Taking for the 10/4/22 encounter (Office Visit) with Samantha Castaneda APRN   Medication Sig Dispense Refill   • apixaban (Eliquis) 5 MG tablet tablet Take 1 tablet by mouth Every 12 (Twelve) Hours. 180 tablet 3   • aspirin 81 MG tablet Take 1 tablet by mouth  "Daily. 30 tablet 11   • metoprolol succinate XL (TOPROL-XL) 25 MG 24 hr tablet Take 1/2 tablet daily 45 tablet 3   • nitroglycerin (NITROSTAT) 0.4 MG SL tablet Place 1 tablet under the tongue Every 5 (Five) Minutes As Needed for Chest Pain. Take no more than 3 doses in 15 minutes. 25 tablet 11   • pantoprazole (PROTONIX) 40 MG EC tablet Take 1 tablet by mouth Daily. 90 tablet 3   • simvastatin (ZOCOR) 20 MG tablet Take 1 tablet by mouth Every Night. 90 tablet 3   • [DISCONTINUED] apixaban (Eliquis) 5 MG tablet tablet Take 1 tablet by mouth Every 12 (Twelve) Hours. 180 tablet 3   • [DISCONTINUED] ciprofloxacin (CIPRO) 500 MG tablet      • [DISCONTINUED] metoprolol succinate XL (TOPROL-XL) 25 MG 24 hr tablet Take 1/2 tablet daily 45 tablet 3   • [DISCONTINUED] pantoprazole (PROTONIX) 40 MG EC tablet Take 1 tablet by mouth Daily. 90 tablet 3   • [DISCONTINUED] simvastatin (ZOCOR) 20 MG tablet Take 1 tablet by mouth Every Night. 90 tablet 3        Objective     Vital Signs:   /70 (BP Location: Left arm, Patient Position: Sitting, Cuff Size: Adult)   Pulse 87   Ht 182.9 cm (72.01\")   Wt 76.9 kg (169 lb 9.6 oz)   SpO2 99%   BMI 23.00 kg/m²   Wt Readings from Last 3 Encounters:   10/04/22 76.9 kg (169 lb 9.6 oz)   04/20/22 79.1 kg (174 lb 6.4 oz)   03/23/22 79.4 kg (175 lb)         Vitals reviewed.   Constitutional:       Appearance: Well-developed.   Eyes:      General: No scleral icterus.  Neck:      Vascular: No JVD.   Pulmonary:      Effort: Pulmonary effort is normal.      Breath sounds: Examination of the right-lower field reveals rales. Wheezing (bilaterally, clear with coughing) present. Rales (clear with coughing) present.   Cardiovascular:      Normal rate. Regular rhythm.      No gallop.   Pulses:     Intact distal pulses.   Edema:     Peripheral edema absent.   Skin:     General: Skin is warm and dry.   Neurological:      Mental Status: Alert and oriented to person, place, and time.         Result " Review  Data Reviewed:  The following data was reviewed by: TAMELA Archibald on 10/04/2022  Scan on 7/22/2022 by Samantha Castaneda APRN: CBC/CMP/CRP/AMYL/LIPAS/ [facility]/ 7-      CT Angiogram Chest (04/20/2022 11:26)    Lab Results - Last 18 Months   Lab Units 04/20/22  1102 10/12/21  0000 10/11/21  0000 10/08/21  0000 09/30/21  0000 04/21/21  1225   CREATININE mg/dL 1.00  --   --   --   --  1.00   INR   --  1.50 1.50 1.90   < >  --     < > = values in this interval not displayed.            ECG 12 Lead    Date/Time: 10/4/2022 10:22 AM  Performed by: Samantha Castaneda APRN  Authorized by: Samantha Castaneda APRN   Comparison: compared with previous ECG from 9/8/2021  Similar to previous ECG  Rhythm: sinus rhythm  Rate: normal  BPM: 70    Clinical impression: normal ECG               Assessment and Plan   Problem List Items Addressed This Visit        Cardiac and Vasculature    Coronary artery disease involving native coronary artery of native heart without angina pectoris - Primary (Chronic)    Overview     Anterior MI, SCA, LAD stent 2010, [facility], Dr. Schaefer         Current Assessment & Plan     Remains stable. Continue present therapy. Encouraged healthy lifestyle. Needs to stay on aspirin if at all possible given LAD stent - will notify Dr. Bowden today and advise patient whether to restart aspirin.           Relevant Medications    metoprolol succinate XL (TOPROL-XL) 25 MG 24 hr tablet    simvastatin (ZOCOR) 20 MG tablet    pantoprazole (PROTONIX) 40 MG EC tablet    Other Relevant Orders    ECG 12 Lead    Dyslipidemia (Chronic)    Current Assessment & Plan     Unknown control on simvastatin. Encouraged him to see his PCP at least yearly for a Wellness visit and labs.         Relevant Medications    simvastatin (ZOCOR) 20 MG tablet    Essential hypertension (Chronic)    Current Assessment & Plan     Remains well controlled with medications. Continue present therapy.          Relevant Medications     metoprolol succinate XL (TOPROL-XL) 25 MG 24 hr tablet    Other Relevant Orders    ECG 12 Lead    Paroxysmal atrial fibrillation (HCC) (Chronic)    Current Assessment & Plan     Paroxysmal, maintaining sinus rhythm. Ok to hold Eliquis for colonoscopy but needs to stay on aspirin 81 mg daily. Discussed increased risk of occurrence especially with continued excessive alcohol consumption.         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 Advised him to see eye doctor again and if no abnormality will refer to neurology for further evaluation. May need to repeat carotid US as well - had mild plaquing per 11/2017 carotid US.            Mental Health    Alcohol abuse (Chronic)    Current Assessment & Plan     Encouraged him to limit to 2 drinks per day or none. Discussed negative effects of drinking in excess.            Neuro    History of TIA (transient ischemic attack) (Chronic)    Current Assessment & Plan     Encouraged him to see his eye doctor and if no abnormality will need to refer to neurology given continued transient loss of vision in R eye.            Tobacco    Tobacco abuse (Chronic)    Current Assessment & Plan     Encouraged complete smoking cessation. He remains unready to set a quit date.              Patient was given instructions and counseling regarding his condition or for health maintenance advice. Please see specific information pulled into the AVS if appropriate.    Follow Up :   Return in about 6 months (around 4/4/2023) for Recheck.             TAMELA Tyler, ACNP-BC, CHFN-BC

## 2023-04-04 ENCOUNTER — OFFICE VISIT (OUTPATIENT)
Dept: CARDIOLOGY | Facility: CLINIC | Age: 78
End: 2023-04-04
Payer: MEDICARE

## 2023-04-04 VITALS
OXYGEN SATURATION: 98 % | DIASTOLIC BLOOD PRESSURE: 64 MMHG | HEIGHT: 72 IN | SYSTOLIC BLOOD PRESSURE: 118 MMHG | HEART RATE: 84 BPM | WEIGHT: 175 LBS | BODY MASS INDEX: 23.7 KG/M2

## 2023-04-04 DIAGNOSIS — E78.5 DYSLIPIDEMIA: Chronic | ICD-10-CM

## 2023-04-04 DIAGNOSIS — I71.21 ANEURYSM OF ASCENDING AORTA WITHOUT RUPTURE: Chronic | ICD-10-CM

## 2023-04-04 DIAGNOSIS — I25.2 MI, OLD: ICD-10-CM

## 2023-04-04 DIAGNOSIS — Z72.0 TOBACCO ABUSE: Chronic | ICD-10-CM

## 2023-04-04 DIAGNOSIS — I48.0 PAROXYSMAL ATRIAL FIBRILLATION: Chronic | ICD-10-CM

## 2023-04-04 DIAGNOSIS — I25.10 CORONARY ARTERY DISEASE INVOLVING NATIVE CORONARY ARTERY OF NATIVE HEART WITHOUT ANGINA PECTORIS: Primary | Chronic | ICD-10-CM

## 2023-04-04 DIAGNOSIS — Z86.73 HISTORY OF TIA (TRANSIENT ISCHEMIC ATTACK): Chronic | ICD-10-CM

## 2023-04-04 DIAGNOSIS — I10 ESSENTIAL HYPERTENSION: Chronic | ICD-10-CM

## 2023-04-04 DIAGNOSIS — H53.121 TRANSIENT VISUAL LOSS OF RIGHT EYE: Chronic | ICD-10-CM

## 2023-04-04 DIAGNOSIS — F10.10 ALCOHOL ABUSE: Chronic | ICD-10-CM

## 2023-04-04 PROCEDURE — 1160F RVW MEDS BY RX/DR IN RCRD: CPT | Performed by: NURSE PRACTITIONER

## 2023-04-04 PROCEDURE — 3074F SYST BP LT 130 MM HG: CPT | Performed by: NURSE PRACTITIONER

## 2023-04-04 PROCEDURE — 1159F MED LIST DOCD IN RCRD: CPT | Performed by: NURSE PRACTITIONER

## 2023-04-04 PROCEDURE — 99214 OFFICE O/P EST MOD 30 MIN: CPT | Performed by: NURSE PRACTITIONER

## 2023-04-04 PROCEDURE — 3078F DIAST BP <80 MM HG: CPT | Performed by: NURSE PRACTITIONER

## 2023-04-04 RX ORDER — MULTIVIT WITH MINERALS/LUTEIN
250 TABLET ORAL DAILY
COMMUNITY

## 2023-04-04 RX ORDER — MELATONIN
2000 DAILY
COMMUNITY

## 2023-04-04 RX ORDER — B-COMPLEX WITH VITAMIN C
TABLET ORAL
COMMUNITY

## 2023-04-04 NOTE — ASSESSMENT & PLAN NOTE
Remains stable. Continue present therapy. Encouraged healthy lifestyle. Continue aspirin (especially if off Eliquis for procedures) if at all possible given LAD stent.

## 2023-04-04 NOTE — ASSESSMENT & PLAN NOTE
Again encouraged him to limit to 2 drinks per day or none and discussed negative effects of drinking in excess.

## 2023-04-04 NOTE — PROGRESS NOTES
Encounter Date:04/04/2023  Chief Complaint:   Subjective    Solomon Perez is a 77 y.o. male who presents to [facility] today for six month cardiac follow-up.      History of Present Illness   HPI     Coronary Artery Disease     Additional comments: No chest discomfort. No symptoms before MI and LAD stent 2010.            Hypertension     Additional comments: Doesn't check at home. Was getting headaches every afternoon at 2 pm - last fall. Took something natural and headaches resolved. Rare dizziness if stands up too quickly but goes away quickly. No near syncope or syncope. No nosebleeds.           Atrial Fibrillation     Additional comments: Paroxysmal. No bleeding or falls on Eliquis other than falling on the ice without injury. No palpitations.           THORACIC AORTIC ANEURYSM     Additional comments: 4.1 cm - Dr. Osman following. Scheduled for repeat CT 4/26/23.            TRANSIENT CEREBRAL ISCHEMIA     Additional comments: He reports no residual. Had drooping and dripping of liquids, slurred speech when trying to drink a Sun Drop in 2017.           Follow-up     Additional comments: 6 MO FU/ LAST SEEN 10-4-22/ DONE EKG 10-4-22          Last edited by Samantha Castaneda APRN on 4/4/2023  3:31 PM.        Past Medical History:   Diagnosis Date   • Aneurysm    • Atrial fibrillation by electrocardiogram    • Bilateral carotid bruits    • Chest pain    • Coronary artery disease involving native coronary artery of native heart with angina pectoris      5/2015- ECHO: EF 60-65% 5/2015- DSE: negative for ischemia. 2012- Stress: negative for ischemia.   • Expressive dysphasia 11/16/2017   • History of essential hypertension    • History of PTCA     12/2010- drug-eluting stent to the LAD.   • Hyperlipidemia, mixed    • Malaise and fatigue    • MI, old     2010, LAD stent   • Peptic ulcer     NOS   • Stroke    • Transient cerebral ischemia 11/16/2017     Past Surgical History:    Procedure Laterality Date   • BACK SURGERY     • COLONOSCOPY  2023    Dr. Bowden   • CORONARY STENT PLACEMENT  12/2010   • HIP SURGERY Right 01/2022   • SINUS SURGERY  04/2012   • TOTAL HIP ARTHROPLASTY Right 09/2022    Dr. Nj, [facility]     Family History   Problem Relation Age of Onset   • Coronary artery disease Other    • Colon cancer Mother    • Heart attack Father    • Heart failure Father      Social History     Socioeconomic History   • Marital status:    Tobacco Use   • Smoking status: Every Day     Packs/day: 0.75     Years: 40.00     Pack years: 30.00     Types: Cigarettes     Start date: 1954     Passive exposure: Past   • Smokeless tobacco: Former   • Tobacco comments:     has quit several times up to 15 years, less than 1 ppd for a total of 40 years.    Vaping Use   • Vaping Use: Never used   Substance and Sexual Activity   • Alcohol use: Not Currently     Alcohol/week: 36.0 standard drinks     Types: 36 Cans of beer per week     Comment: drinks 0-7, averages 5-6 when he drinks, almost every day but not every day   • Drug use: Never   • Sexual activity: Defer       History: Past medical, surgical, family, and social history reviewed.    Outpatient Medications Marked as Taking for the 4/4/23 encounter (Office Visit) with Samantha Castaneda APRN   Medication Sig Dispense Refill   • apixaban (Eliquis) 5 MG tablet tablet Take 1 tablet by mouth Every 12 (Twelve) Hours. 180 tablet 3   • aspirin 81 MG tablet Take 1 tablet by mouth Daily. 30 tablet 11   • Cholecalciferol 25 MCG (1000 UT) tablet Take 2 tablets by mouth Daily.     • metoprolol succinate XL (TOPROL-XL) 25 MG 24 hr tablet Take 1/2 tablet daily 45 tablet 3   • nitroglycerin (NITROSTAT) 0.4 MG SL tablet Place 1 tablet under the tongue Every 5 (Five) Minutes As Needed for Chest Pain. Take no more than 3 doses in 15 minutes. 25 tablet 11   • pantoprazole (PROTONIX) 40 MG EC tablet Take 1 tablet by mouth Daily. 90 tablet 3   • simvastatin  "(ZOCOR) 20 MG tablet Take 1 tablet by mouth Every Night. 90 tablet 3   • vitamin C (ASCORBIC ACID) 250 MG tablet Take 1 tablet by mouth Daily. Unsure of dose     • Zinc 100 MG tablet Take  by mouth. Unsure of dose          Objective     Vital Signs:   /64 (BP Location: Left arm, Patient Position: Sitting, Cuff Size: Adult)   Pulse 84   Ht 182.9 cm (72.01\")   Wt 79.4 kg (175 lb)   SpO2 98%   BMI 23.73 kg/m²   Wt Readings from Last 3 Encounters:   04/04/23 79.4 kg (175 lb)   10/04/22 76.9 kg (169 lb 9.6 oz)   04/20/22 79.1 kg (174 lb 6.4 oz)         Vitals reviewed.   Constitutional:       Appearance: Well-developed.   Eyes:      General: No scleral icterus.  Neck:      Vascular: No JVD.   Pulmonary:      Effort: Pulmonary effort is normal.      Breath sounds: Examination of the right-lower field reveals rales. Wheezing (bilaterally, clear with coughing) present. Rales (clear with coughing) present.   Cardiovascular:      Normal rate. Regular rhythm.      No gallop.   Pulses:     Intact distal pulses.   Edema:     Peripheral edema absent.   Musculoskeletal:      Left hand: Deformity (L fingers contracted - he can straighten with other hand, first finger partially amputated - work injury at [employer]) present. Skin:     General: Skin is warm and dry.      Findings: Bruising (BUEs) present.   Neurological:      Mental Status: Alert and oriented to person, place, and time.         Result Review  Data Reviewed:  The following data was reviewed by: TAMELA Archibald on 04/04/2023  Scan on 10/4/2022 1235 by Louisa Yates RegSched Rep: EKG 10-4-2022    Scan on 7/22/2022 by Samantha aCstaneda APRN: CBC/CMP/CRP/AMYL/LIPAS/ [facility]/ 7-     Lab Results - Last 18 Months   Lab Units 04/20/22  1102 10/12/21  0000 10/11/21  0000 10/08/21  0000   CREATININE mg/dL 1.00  --   --   --    INR   --  1.50 1.50 1.90   CT Angiogram Chest (04/20/2022 11:26)  Scan on 11/14/2017: CAROTID DOPPLER, 11/14/2017        "             Assessment and Plan   Problem List Items Addressed This Visit        Cardiac and Vasculature    Coronary artery disease involving native coronary artery of native heart without angina pectoris - Primary (Chronic)    Overview     Anterior MI, SCA, LAD stent 2010, [facility], Dr. Schaefer         Current Assessment & Plan     Remains stable. Continue present therapy. Encouraged healthy lifestyle. Continue aspirin (especially if off Eliquis for procedures) if at all possible given LAD stent.         Relevant Orders    CBC Auto Differential    Comprehensive Metabolic Panel    Lipid Panel    Dyslipidemia (Chronic)    Current Assessment & Plan     Unknown control on simvastatin. Check labs at least yearly. Provided him with lab orders.         Relevant Orders    Comprehensive Metabolic Panel    Lipid Panel    TSH    Essential hypertension (Chronic)    Current Assessment & Plan     Remains well controlled with medications. Continue present therapy.          Paroxysmal atrial fibrillation (HCC) (Chronic)    Current Assessment & Plan     Paroxysmal, maintaining sinus rhythm. Ok to hold Eliquis for colonoscopy but needs to stay on aspirin 81 mg daily. Discussed increased risk of occurrence especially with continued excessive alcohol consumption.         Relevant Orders    CBC Auto Differential    Magnesium    Thoracic aortic aneurysm without rupture (Chronic)    Overview     4.1 cm ascending TAA 4/22 CTA chest followed by CTS         Current Assessment & Plan     Remains stable. Continue optimal BP control and avoid straining.         RESOLVED: MI, old    Overview     2010, LAD stent            Eye    Transient visual loss of right eye (Chronic)    Current Assessment & Plan     Reportedly resolved (and he doesn't recall having). Had mild plaquing per 11/2017 carotid US.            Mental Health    Alcohol abuse (Chronic)    Current Assessment & Plan     Again encouraged him to limit to 2 drinks per day or none and discussed  negative effects of drinking in excess.         Relevant Orders    Magnesium       Neuro    History of TIA (transient ischemic attack) (Chronic)    Current Assessment & Plan     Continue aspirin and Eliquis.            Tobacco    Tobacco abuse (Chronic)    Current Assessment & Plan     Encouraged complete smoking cessation. He remains unready to set a quit date.           Patient was given instructions and counseling regarding his condition or for health maintenance advice. Please see specific information pulled into the AVS if appropriate.    Follow Up :   Return in about 1 year (around 4/4/2024) for Recheck.             Samantha Castaneda, APRN, ACNP-BC, CHFN-BC

## 2023-04-12 ENCOUNTER — TELEPHONE (OUTPATIENT)
Dept: CARDIOLOGY | Facility: CLINIC | Age: 78
End: 2023-04-12

## 2023-04-12 NOTE — TELEPHONE ENCOUNTER
Called patient, left voicemail. Advised we would get results once they are back since sam duenas ordered. Advised him to call if he doesn't hear from us by Monday. Thx

## 2023-04-12 NOTE — TELEPHONE ENCOUNTER
Caller: MAL FRANCISCOORY      Relationship: SELF    Best call back number: [phone]    What is the best time to reach you: ANYTIME    Who are you requesting to speak with (clinical staff, provider,  specific staff member): ANYONE    What was the call regarding: PT WAS IN THE [facility] ON 4.11.23 AND HAD LABWORK DONE. PT IS NEEDING TO KNOW IF THE LAB RESULTS FROM THE LABWORK WILL BE SUBMITTED TO TAMELA ESQUIVEL?     Do you require a callback: YES

## 2023-04-14 NOTE — TELEPHONE ENCOUNTER
Were there any other ER notes or imaging? He had CBC and CMP - still needs to get Mg, TSH, and lipid panel done. TX!

## 2023-04-14 NOTE — TELEPHONE ENCOUNTER
Definitely - get a copy of all of his ER notes and route to me for review. Will not need to repeat or duplicate any labs they already did. TX!

## 2023-04-14 NOTE — TELEPHONE ENCOUNTER
Pt called back saying that he went to the ER at [facility] recently and throught maybe they would have drawn some of the bloodwork that Samantha wanted.  I told him that we could check what he had done there and depending on what was draw, we could have him do whatever else Samantha had previously ordered.

## 2023-04-26 ENCOUNTER — OFFICE VISIT (OUTPATIENT)
Dept: CARDIAC SURGERY | Facility: CLINIC | Age: 78
End: 2023-04-26
Payer: MEDICARE

## 2023-04-26 ENCOUNTER — HOSPITAL ENCOUNTER (OUTPATIENT)
Dept: CT IMAGING | Facility: HOSPITAL | Age: 78
Discharge: HOME | End: 2023-04-26
Payer: MEDICARE

## 2023-04-26 VITALS
OXYGEN SATURATION: 96 % | SYSTOLIC BLOOD PRESSURE: 114 MMHG | DIASTOLIC BLOOD PRESSURE: 64 MMHG | HEART RATE: 71 BPM | HEIGHT: 72 IN | BODY MASS INDEX: 23.7 KG/M2 | WEIGHT: 175 LBS

## 2023-04-26 DIAGNOSIS — I71.20 THORACIC AORTIC ANEURYSM WITHOUT RUPTURE: ICD-10-CM

## 2023-04-26 DIAGNOSIS — I71.21 ANEURYSM OF ASCENDING AORTA WITHOUT RUPTURE: Primary | Chronic | ICD-10-CM

## 2023-04-26 DIAGNOSIS — Z72.0 TOBACCO ABUSE: Chronic | ICD-10-CM

## 2023-04-26 LAB — CREAT BLDA-MCNC: 1.2 MG/DL (ref 0.6–1.3)

## 2023-04-26 PROCEDURE — 3078F DIAST BP <80 MM HG: CPT | Performed by: THORACIC SURGERY (CARDIOTHORACIC VASCULAR SURGERY)

## 2023-04-26 PROCEDURE — 25510000001 IOPAMIDOL PER 1 ML: Performed by: NURSE PRACTITIONER

## 2023-04-26 PROCEDURE — 71275 CT ANGIOGRAPHY CHEST: CPT

## 2023-04-26 PROCEDURE — 3074F SYST BP LT 130 MM HG: CPT | Performed by: THORACIC SURGERY (CARDIOTHORACIC VASCULAR SURGERY)

## 2023-04-26 PROCEDURE — 99213 OFFICE O/P EST LOW 20 MIN: CPT | Performed by: THORACIC SURGERY (CARDIOTHORACIC VASCULAR SURGERY)

## 2023-04-26 PROCEDURE — 1159F MED LIST DOCD IN RCRD: CPT | Performed by: THORACIC SURGERY (CARDIOTHORACIC VASCULAR SURGERY)

## 2023-04-26 PROCEDURE — 1160F RVW MEDS BY RX/DR IN RCRD: CPT | Performed by: THORACIC SURGERY (CARDIOTHORACIC VASCULAR SURGERY)

## 2023-04-26 PROCEDURE — 82565 ASSAY OF CREATININE: CPT

## 2023-04-26 RX ORDER — TRAMADOL HYDROCHLORIDE 50 MG/1
TABLET ORAL AS NEEDED
COMMUNITY
Start: 2023-04-03

## 2023-04-26 RX ADMIN — IOPAMIDOL 100 ML: 755 INJECTION, SOLUTION INTRAVENOUS at 14:16

## 2023-04-26 NOTE — PROGRESS NOTES
Subjective   Patient ID: Solomon Perez is a 78 y.o. male who is here for follow-up of a known aneurysm.   Chief Complaint   Patient presents with   • Thoracic Aortic Aneurysm without Rupture     Pt is here for 1 year follow-up w/ CT     History of Present Illness    SOLOMON PEREZ is a 78-year-old male who presents today for a follow-up.    Patient states he is doing generally well.  Patient had a hip replacement since his last visit.  He almost passed out approximately 1 week ago.  He went to the emergency room in [place], [place].  He was told he was dehydrated.  He drinks water.  Patient continues to smoke.  He does not want to quit.  He sees Dr. Resendiz once a year.     The following portions of the patient's history were reviewed and updated as appropriate: allergies, current medications, past family history, past medical history, past social history, past surgical history and problem list.       Objective   Physical Exam  Constitutional:       Appearance: He is well-developed.   HENT:      Head: Normocephalic and atraumatic.   Eyes:      Pupils: Pupils are equal, round, and reactive to light.   Neck:      Thyroid: No thyromegaly.      Vascular: No JVD.      Trachea: No tracheal deviation.   Cardiovascular:      Rate and Rhythm: Normal rate and regular rhythm.      Heart sounds: Normal heart sounds. No murmur heard.    No friction rub. No gallop.   Pulmonary:      Effort: Pulmonary effort is normal. No respiratory distress.      Breath sounds: Normal breath sounds. No wheezing or rales.   Chest:      Chest wall: No tenderness.   Abdominal:      General: There is no distension.      Palpations: Abdomen is soft.      Tenderness: There is no abdominal tenderness.   Musculoskeletal:         General: Normal range of motion.      Cervical back: Normal range of motion and neck supple.   Lymphadenopathy:      Cervical: No cervical adenopathy.   Skin:     General: Skin is warm and dry.   Neurological:      Mental Status: He is  alert and oriented to person, place, and time.      Cranial Nerves: No cranial nerve deficit.         CT Angiogram Chest    Result Date: 4/26/2023  Narrative: EXAMINATION: CT ANGIOGRAM CHEST- 4/26/2023 4:29 PM CDT  HISTORY: Thoracic Aortic Aneurysm without Rupture; I71.20-Thoracic aortic aneurysm, without rupture, unspecified  DOSE: 383 mGycm (Automatic exposure control technique was implemented in an effort to keep the radiation dose as low as possible without compromising image quality)  REPORT: Spiral CT of the chest was performed after administration of intravenous contrast from the thoracic inlet through the upper abdomen using CTA protocol, which includes reconstructed maximum intensity projection (MIP)coronal and sagittal images.  Comparison: CT angiogram chest 4/20/2022.  The contrast bolus is satisfactory, there is mild respiratory and cardiac motion artifact. The central pulmonary arteries are normal caliber and no evidence of pulmonary embolism is identified. Heart size is normal. The RV LV ratio is less than 1, normal. There is heavy calcified plaque within the LAD coronary artery distribution. Maximum diameter of the ascending aorta measures approximately 4.1 x 3.9 cm, at the arch level, the aorta has a maximum diameter 3.1 cm and the descending thoracic aorta has a maximum diameter 2.7 cm. No evidence of aortic dissection is identified. There is normal patency of the origins of the great vessels. The thyroid gland appears normal. There is mild mucosal thickening at the distal esophagus, mild esophagitis is not excluded. No intrathoracic lymphadenopathy is identified. There is no pleural effusion. Review of lung windows demonstrates mild bibasilar atelectasis, no evidence of pneumonia. There is no pneumothorax. Scattered calcified granulomas are present. The airways are patent. The visualized upper abdomen demonstrates moderate volume of ingested food in the stomach with mild gastric distention. There  is contraction of the gallbladder. Review of bone windows shows extensive degenerative endplate spurring and syndesmophytes throughout the thoracic and lumbar spine. No acute osseous abnormality.      Impression: 1. Stable CTA chest, with mild dilation of the ascending aorta, with a maximum diameter 4.1 x 3.9 cm. No evidence of aortic dissection. There is normal caliber of the aorta at the level of the arch and descending thoracic aorta. No evidence of pulmonary embolism. There is heavy calcified atherosclerotic plaque within the LAD coronary artery distribution as before. 2. Mild mucosal thickening of the distal esophagus may reflect mild esophagitis. 3. Advanced degenerative changes throughout the thoracic and lumbar spine with extensive syndesmophyte production.    This report was finalized on 04/26/2023 16:38 by Dr. Alcides Miranda MD.    Independent interpretation:  His ascending aorta includes a distal ascending aorta measuring 3.6 cm in size and at greatest size 4.3 cm size, and his aortic root measures 3.6 cm. Previously, this has measured 4.2 cm, 4.1 cm, 4.2 cm, 4.2 cm, 4.2 cm, and 4.3 cm.    Diagnoses and all orders for this visit:    1. Tobacco abuse (Primary)          Assessment & Plan        Impression:  1. Ascending aortic aneurysm measuring 4.3 cm in size.  2. Chronic tobacco use.    Medical decision making/Recommendations/Plan:  I will recommend ongoing active surveillance. Given his ongoing aortic stability, we will proceed with. He is seeing Patricia Christie in 1 year and then to return to see me the year thereafter. Plan for a CT angiogram chest in 1 year. I did discuss the value of smoking cessation for a total of 3 minutes. He is really not that interested in quitting smoking. He does say Dr. Resendiz gets on him too for quitting smoking. I tried to support that it is a heart healthy and overall good health choice to quit smoking.    Many thanks again, I will continue to keep you processing care as  ensues. He is a well apprised of acute aortic syndrome and his restrictions for his aortopathy identified.    Transcribed from ambient dictation for Elton Osman MD by Tiffanie Lockhart.  04/26/23   16:58 CDT    Patient or patient representative verbalized consent to the visit recording.  I have personally performed the services described in this document as transcribed by the above individual, and it is both accurate and complete.

## 2023-05-02 DIAGNOSIS — I71.20 THORACIC AORTIC ANEURYSM WITHOUT RUPTURE, UNSPECIFIED PART: Primary | Chronic | ICD-10-CM

## 2023-11-24 DIAGNOSIS — E78.5 DYSLIPIDEMIA: ICD-10-CM

## 2023-11-24 DIAGNOSIS — I25.10 CORONARY ARTERY DISEASE INVOLVING NATIVE CORONARY ARTERY OF NATIVE HEART WITHOUT ANGINA PECTORIS: Chronic | ICD-10-CM

## 2023-11-24 DIAGNOSIS — I10 ESSENTIAL HYPERTENSION: Chronic | ICD-10-CM

## 2023-11-24 DIAGNOSIS — I48.0 PAROXYSMAL ATRIAL FIBRILLATION: ICD-10-CM

## 2023-11-27 RX ORDER — SIMVASTATIN 20 MG
20 TABLET ORAL NIGHTLY
Qty: 90 TABLET | Refills: 3 | Status: SHIPPED | OUTPATIENT
Start: 2023-11-27

## 2023-11-27 RX ORDER — PANTOPRAZOLE SODIUM 40 MG/1
40 TABLET, DELAYED RELEASE ORAL DAILY
Qty: 90 TABLET | Refills: 3 | Status: SHIPPED | OUTPATIENT
Start: 2023-11-27

## 2023-11-27 RX ORDER — APIXABAN 5 MG/1
5 TABLET, FILM COATED ORAL EVERY 12 HOURS
Qty: 180 TABLET | Refills: 3 | Status: SHIPPED | OUTPATIENT
Start: 2023-11-27

## 2023-11-27 RX ORDER — METOPROLOL SUCCINATE 25 MG/1
TABLET, EXTENDED RELEASE ORAL
Qty: 45 TABLET | Refills: 3 | Status: SHIPPED | OUTPATIENT
Start: 2023-11-27

## 2024-01-04 ENCOUNTER — TELEPHONE (OUTPATIENT)
Dept: CARDIOLOGY | Facility: CLINIC | Age: 79
End: 2024-01-04

## 2024-01-04 NOTE — TELEPHONE ENCOUNTER
We received a fax today with a cxr and spine xrays. They are in chart. I believe this is what the patient is referring to

## 2024-01-04 NOTE — TELEPHONE ENCOUNTER
Caller: Tamiko Castaneda    Relationship to patient: Emergency Contact    Best call back number: [phone]    Chief complaint: PATIENT FELL AND WAS TOLD BY PCP TO FOLLOW UP WITH HEART DOCTOR    Type of visit: FOLLOW UP    Requested date: ASAP     If rescheduling, when is the original appointment: NONE     Additional notes:NONE

## 2024-01-04 NOTE — TELEPHONE ENCOUNTER
Spoke with patient. Stated he was just standing there, stumbled backwards, and fell. Does not recall getting dizzy or blacking out. Asked if he checks his BP and he does not. Advised to check BP daily and bring readings with him to his appt (if he can find his monitor). Clare

## 2024-01-18 ENCOUNTER — OFFICE VISIT (OUTPATIENT)
Dept: CARDIOLOGY | Facility: CLINIC | Age: 79
End: 2024-01-18
Payer: MEDICARE

## 2024-01-18 VITALS
HEART RATE: 72 BPM | DIASTOLIC BLOOD PRESSURE: 68 MMHG | SYSTOLIC BLOOD PRESSURE: 118 MMHG | BODY MASS INDEX: 23.4 KG/M2 | HEIGHT: 72 IN | OXYGEN SATURATION: 99 % | WEIGHT: 172.8 LBS

## 2024-01-18 DIAGNOSIS — I10 ESSENTIAL HYPERTENSION: Chronic | ICD-10-CM

## 2024-01-18 DIAGNOSIS — I25.10 CORONARY ARTERY DISEASE INVOLVING NATIVE CORONARY ARTERY OF NATIVE HEART WITHOUT ANGINA PECTORIS: Chronic | ICD-10-CM

## 2024-01-18 DIAGNOSIS — I48.0 PAROXYSMAL ATRIAL FIBRILLATION: Chronic | ICD-10-CM

## 2024-01-18 DIAGNOSIS — F10.11 HISTORY OF ALCOHOL ABUSE: ICD-10-CM

## 2024-01-18 DIAGNOSIS — R26.89 BALANCE PROBLEMS: Primary | ICD-10-CM

## 2024-01-18 DIAGNOSIS — Z72.0 TOBACCO ABUSE: Chronic | ICD-10-CM

## 2024-01-18 DIAGNOSIS — H53.121 TRANSIENT VISUAL LOSS OF RIGHT EYE: Chronic | ICD-10-CM

## 2024-01-18 DIAGNOSIS — Z86.73 HISTORY OF TIA (TRANSIENT ISCHEMIC ATTACK): Chronic | ICD-10-CM

## 2024-01-18 DIAGNOSIS — E78.5 DYSLIPIDEMIA: Chronic | ICD-10-CM

## 2024-01-18 DIAGNOSIS — I71.21 ANEURYSM OF ASCENDING AORTA WITHOUT RUPTURE: Chronic | ICD-10-CM

## 2024-01-18 PROCEDURE — 3078F DIAST BP <80 MM HG: CPT | Performed by: NURSE PRACTITIONER

## 2024-01-18 PROCEDURE — 1160F RVW MEDS BY RX/DR IN RCRD: CPT | Performed by: NURSE PRACTITIONER

## 2024-01-18 PROCEDURE — 93000 ELECTROCARDIOGRAM COMPLETE: CPT | Performed by: NURSE PRACTITIONER

## 2024-01-18 PROCEDURE — 99214 OFFICE O/P EST MOD 30 MIN: CPT | Performed by: NURSE PRACTITIONER

## 2024-01-18 PROCEDURE — 1159F MED LIST DOCD IN RCRD: CPT | Performed by: NURSE PRACTITIONER

## 2024-01-18 PROCEDURE — 3074F SYST BP LT 130 MM HG: CPT | Performed by: NURSE PRACTITIONER

## 2024-01-18 RX ORDER — HYDROCODONE BITARTRATE AND ACETAMINOPHEN 10; 325 MG/1; MG/1
1 TABLET ORAL EVERY 4 HOURS PRN
COMMUNITY
Start: 2024-01-02

## 2024-01-18 RX ORDER — TIZANIDINE 2 MG/1
2 TABLET ORAL EVERY 6 HOURS PRN
COMMUNITY
Start: 2024-01-02

## 2024-01-18 NOTE — PROGRESS NOTES
Encounter Date:01/18/2024  Chief Complaint:   Subjective    Solomon Perez is a 78 y.o. male who presents to [facility] today for cardiac follow-up sooner than yearly due to falls.      History of Present Illness   HPI       Coronary Artery Disease     Additional comments: No chest discomfort. Hx anterior MI, SCA, LAD stent 2010.             Hypertension     Additional comments: Hasn't been checking his BP - doesn't know how to check it. Doesn't know if automatic or manual.             TAA     Additional comments: 4.1 cm ascending TAA 4/22 CTA chest - followed by CTS.             Atrial Fibrillation     Additional comments: Paroxysmal. No palpitations but can't tell when in afib or sinus. Has fallen 5 times in the last 3-4 months. Falls backwards out of the blue. Started happening last summer. Bruised R back/side 12/31/23. Had a TIA years ago in 2017 - lost vision in R eye temporarily - still comes and goes about once a week or so - vision gets blurry but didn't tell his eye doctor - he saw them last week. Hasn't seen neurology. Had MRI/MRA brain 2017.             Follow-up     Additional comments: [place] [facility] [place]/ unstable gait. When he stands up he just stumbles back and tends to fall. Last 3-4 months its happened about 5 times. Has a big bruise on right side.             Fall     Additional comments: No head injury. Started before taking hydrocodone and tizanidine for back pain after fall. Off hydrocodone and just taking tizanidine at bedtime now.          Last edited by Samantha Castaneda APRN on 1/18/2024 11:13 AM.        Social History     Socioeconomic History    Marital status:    Tobacco Use    Smoking status: Every Day     Packs/day: 0.50     Years: 68.00     Additional pack years: 0.00     Total pack years: 34.00     Types: Cigarettes     Start date: 1955     Passive exposure: Past    Smokeless tobacco: Former    Tobacco comments:      Pt has gone back and forth with smoking since about 1955. Currently smokes about 0.5 ppd, down from 0.75 ppd   Vaping Use    Vaping Use: Never used   Substance and Sexual Activity    Alcohol use: Not Currently     Alcohol/week: 35.0 standard drinks of alcohol     Types: 35 Cans of beer per week     Comment: drank about 5 beers a day- but depends on the day. Sometimes more, sometimes less but none in months as of 1/2024.    Drug use: Never    Sexual activity: Defer     History: Past medical, surgical, family, and social history reviewed.    Outpatient Medications Marked as Taking for the 1/18/24 encounter (Office Visit) with Samantha Castaneda APRN   Medication Sig Dispense Refill    aspirin 81 MG tablet Take 1 tablet by mouth Daily. 30 tablet 11    cholecalciferol (VITAMIN D3) 25 MCG (1000 UT) tablet Take 2 tablets by mouth Daily.      Eliquis 5 MG tablet tablet TAKE 1 TABLET EVERY 12 HOURS 180 tablet 3    HYDROcodone-acetaminophen (NORCO)  MG per tablet Take 1 tablet by mouth Every 4 (Four) Hours As Needed.      metoprolol succinate XL (TOPROL-XL) 25 MG 24 hr tablet TAKE ONE-HALF (1/2) TABLET DAILY 45 tablet 3    nitroglycerin (NITROSTAT) 0.4 MG SL tablet Place 1 tablet under the tongue Every 5 (Five) Minutes As Needed for Chest Pain. Take no more than 3 doses in 15 minutes. 25 tablet 11    pantoprazole (PROTONIX) 40 MG EC tablet TAKE 1 TABLET DAILY 90 tablet 3    simvastatin (ZOCOR) 20 MG tablet TAKE 1 TABLET EVERY NIGHT 90 tablet 3    tiZANidine (ZANAFLEX) 2 MG tablet Take 1 tablet by mouth Every 6 (Six) Hours As Needed. *unsure if this is right name of muscle relaxer*      traMADol (ULTRAM) 50 MG tablet Take  by mouth As Needed for Moderate Pain.      vitamin C (ASCORBIC ACID) 250 MG tablet Take 1 tablet by mouth Daily. Unsure of dose      Zinc 100 MG tablet Take  by mouth Daily. Unsure of dose          Objective     Vital Signs:   /68 (BP Location: Left arm, Patient Position: Sitting, Cuff Size:  "Adult)   Pulse 72   Ht 182.9 cm (72.01\")   Wt 78.4 kg (172 lb 12.8 oz)   SpO2 99%   BMI 23.43 kg/m²   Wt Readings from Last 3 Encounters:   01/18/24 78.4 kg (172 lb 12.8 oz)   04/26/23 79.4 kg (175 lb)   04/04/23 79.4 kg (175 lb)         Vitals reviewed.   Constitutional:       Appearance: Well-developed.   Eyes:      General: No scleral icterus.  Neck:      Vascular: No JVD.   Pulmonary:      Effort: Pulmonary effort is normal.      Breath sounds: Examination of the right-lower field reveals rales. No wheezing. Rales (clear with coughing) present.   Cardiovascular:      Normal rate. Regular rhythm.      No gallop.    Pulses:     Intact distal pulses.   Edema:     Peripheral edema absent.   Musculoskeletal:      Left hand: Deformity (L fingers contracted - he can straighten with other hand, first finger partially amputated - work injury at [employer]) present. Skin:     General: Skin is warm and dry.      Findings: Bruising (BUEs) present.   Neurological:      Mental Status: Alert and oriented to person, place, and time.      Comments: No focal deficits other than loss of balance with eyes closed - starts leaning back.         Result Review  Data Reviewed:  The following data was reviewed by: TAMELA Archibald on 01/18/2024  Scan on 1/4/2024 1220 by Samantha Castaneda APRN: [place] [facility] [place]   Scan on 1/2/2024 1631 by Samantha Castaneda APRN: [place] [facility] [place]  Scan on 5/31/2023 by Samantha Castaneda APRN: MG/LIPID/TSH/ [facility] [place]-      Lab Results - Last 18 Months   Lab Units 04/26/23  1407   CREATININE mg/dL 1.20            ECG 12 Lead    Date/Time: 1/18/2024 11:21 AM  Performed by: Samantha Castaneda APRN    Authorized by: Samantha Castaneda APRN  Comparison: compared with previous ECG from 10/4/2022  Similar to previous ECG  Rhythm: sinus rhythm  Rate: normal  BPM: 71    Clinical impression: normal ECG             Assessment and Plan   Problem List Items " Addressed This Visit          Cardiac and Vasculature    Coronary artery disease involving native coronary artery of native heart without angina pectoris (Chronic)    Overview     Anterior MI, SCA, LAD stent 2010, [facility], Dr. Schaefer         Current Assessment & Plan     Remains stable. Remains stable. Continue present therapy. Encouraged healthy lifestyle. Continue aspirin (especially if off Eliquis for procedures) if at all possible given LAD stent. Call if any worsening.         Dyslipidemia (Chronic)    Overview     5/31/23 , TG 61, HDL 53, LDL 60         Current Assessment & Plan     Well controlled on simvastatin per 5/2023 labs. Check labs at least yearly.          Essential hypertension (Chronic)    Current Assessment & Plan     Remains very well controlled with medications. Continue present therapy. Advised him to check BP and HR as soon as possible if has any further falls. Given balance issues and falls, I recommended neurology evaluation or at least PT evaluation and treatment - he declines both.         Paroxysmal atrial fibrillation (Chronic)    Current Assessment & Plan     Paroxysmal, maintaining sinus rhythm. Previously discussed increased risk of occurrence especially with continued excessive alcohol consumption. Continue aspirin and Eliquis for now. Discussed increased risk of possibly life threatening brain bleed if he falls and hits his head. Recommended he consider Watchman NORIS closure device be implanted to decrease this risk given balance issues and recent frequent falls. He declined. Advised him to go to ER if falls and hits his head. He will call if no improvement or any worsening or if he changes his mind.         Relevant Orders    ECG 12 Lead    Thoracic aortic aneurysm without rupture (Chronic)    Overview     4.1 cm ascending TAA 4/22 CTA chest followed by CTS  4.1 x 3.9 cm 4/2023 CTA         Current Assessment & Plan     Remains stable. Continue optimal BP control and avoid  straining.            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 Had mild plaquing per 11/2017 carotid US.            Mental Health    History of alcohol abuse    Current Assessment & Plan     Encouraged continued alcohol abstinence.            Neuro    History of TIA (transient ischemic attack) (Chronic)    Current Assessment & Plan     Continue aspirin and Eliquis.            Symptoms and Signs    Balance problems - Primary    Current Assessment & Plan     Probable cause of falls. He states symptoms started before taking hydrocodone and tizanidine which he is weaning off of - reviewed possible side effects of opiod pain meds and muscle relaxers. Recommended neurology evaluation or at least PT evaluation and treatment. He declines both but will call if doesn't improve or worsens. Encouraged safety.            Tobacco    Tobacco abuse (Chronic)    Current Assessment & Plan     Again encouraged complete smoking cessation. He remains unready to set a quit date or cut back any further.           Patient was given instructions and counseling regarding his condition or for health maintenance advice. Please see specific information pulled into the AVS if appropriate.    Follow Up :   Return in about 3 months (around 4/18/2024) for Recheck (keep scheduled 4/4/24 for now - ok to extend to July if symptoms improve).             Samantha Castaneda, APRN, ACNP-BC, CHFN-BC

## 2024-01-19 PROBLEM — F10.11 HISTORY OF ALCOHOL ABUSE: Status: ACTIVE | Noted: 2020-09-02

## 2024-01-19 NOTE — ASSESSMENT & PLAN NOTE
Probable cause of falls. He states symptoms started before taking hydrocodone and tizanidine which he is weaning off of - reviewed possible side effects of opiod pain meds and muscle relaxers. Recommended neurology evaluation or at least PT evaluation and treatment. He declines both but will call if doesn't improve or worsens. Encouraged safety.

## 2024-01-19 NOTE — ASSESSMENT & PLAN NOTE
Paroxysmal, maintaining sinus rhythm. Previously discussed increased risk of occurrence especially with continued excessive alcohol consumption. Continue aspirin and Eliquis for now. Discussed increased risk of possibly life threatening brain bleed if he falls and hits his head. Recommended he consider Watchman NORIS closure device be implanted to decrease this risk given balance issues and recent frequent falls. He declined. Advised him to go to ER if falls and hits his head. He will call if no improvement or any worsening or if he changes his mind.

## 2024-01-19 NOTE — ASSESSMENT & PLAN NOTE
Remains very well controlled with medications. Continue present therapy. Advised him to check BP and HR as soon as possible if has any further falls. Given balance issues and falls, I recommended neurology evaluation or at least PT evaluation and treatment - he declines both.

## 2024-01-19 NOTE — ASSESSMENT & PLAN NOTE
Remains stable. Remains stable. Continue present therapy. Encouraged healthy lifestyle. Continue aspirin (especially if off Eliquis for procedures) if at all possible given LAD stent. Call if any worsening.

## 2024-01-19 NOTE — ASSESSMENT & PLAN NOTE
Had resolved but now he reports continues to have intermittent blurriness that his eye doctor is not concerned about. Had mild plaquing per 11/2017 carotid US.

## 2024-01-19 NOTE — ASSESSMENT & PLAN NOTE
Again encouraged complete smoking cessation. He remains unready to set a quit date or cut back any further.

## 2024-04-04 ENCOUNTER — OFFICE VISIT (OUTPATIENT)
Dept: CARDIOLOGY | Facility: CLINIC | Age: 79
End: 2024-04-04
Payer: MEDICARE

## 2024-04-04 VITALS
WEIGHT: 169.9 LBS | SYSTOLIC BLOOD PRESSURE: 112 MMHG | OXYGEN SATURATION: 99 % | HEIGHT: 72 IN | BODY MASS INDEX: 23.01 KG/M2 | HEART RATE: 74 BPM | DIASTOLIC BLOOD PRESSURE: 72 MMHG

## 2024-04-04 DIAGNOSIS — Z86.73 HISTORY OF TIA (TRANSIENT ISCHEMIC ATTACK): Chronic | ICD-10-CM

## 2024-04-04 DIAGNOSIS — F10.11 HISTORY OF ALCOHOL ABUSE: Chronic | ICD-10-CM

## 2024-04-04 DIAGNOSIS — I71.21 ANEURYSM OF ASCENDING AORTA WITHOUT RUPTURE: Chronic | ICD-10-CM

## 2024-04-04 DIAGNOSIS — E78.5 DYSLIPIDEMIA: Chronic | ICD-10-CM

## 2024-04-04 DIAGNOSIS — Z72.0 TOBACCO ABUSE: Chronic | ICD-10-CM

## 2024-04-04 DIAGNOSIS — I48.0 PAROXYSMAL ATRIAL FIBRILLATION: Chronic | ICD-10-CM

## 2024-04-04 DIAGNOSIS — I25.10 CORONARY ARTERY DISEASE INVOLVING NATIVE CORONARY ARTERY OF NATIVE HEART WITHOUT ANGINA PECTORIS: Primary | Chronic | ICD-10-CM

## 2024-04-04 DIAGNOSIS — I10 ESSENTIAL HYPERTENSION: Chronic | ICD-10-CM

## 2024-04-04 PROCEDURE — 1159F MED LIST DOCD IN RCRD: CPT | Performed by: NURSE PRACTITIONER

## 2024-04-04 PROCEDURE — 3078F DIAST BP <80 MM HG: CPT | Performed by: NURSE PRACTITIONER

## 2024-04-04 PROCEDURE — 99214 OFFICE O/P EST MOD 30 MIN: CPT | Performed by: NURSE PRACTITIONER

## 2024-04-04 PROCEDURE — 1160F RVW MEDS BY RX/DR IN RCRD: CPT | Performed by: NURSE PRACTITIONER

## 2024-04-04 PROCEDURE — 3074F SYST BP LT 130 MM HG: CPT | Performed by: NURSE PRACTITIONER

## 2024-04-04 RX ORDER — METOPROLOL SUCCINATE 25 MG/1
12.5 TABLET, EXTENDED RELEASE ORAL NIGHTLY
Start: 2024-04-04

## 2024-04-04 RX ORDER — CETIRIZINE HYDROCHLORIDE 10 MG/1
5-10 TABLET ORAL DAILY PRN
COMMUNITY

## 2024-04-04 NOTE — ASSESSMENT & PLAN NOTE
Remains very well controlled with medications. Continue present therapy but change Toprol to bedtime to see if improves occasional dizziness. Advised him to check BP and HR as soon as possible if has any further falls. Given balance issues and falls, I previously recommended neurology evaluation or at least PT evaluation and treatment - he declined both.

## 2024-04-04 NOTE — ASSESSMENT & PLAN NOTE
Remains stable. Continue optimal BP control and avoid straining. CT chest ordered to be done later this month or early May.

## 2024-04-04 NOTE — PROGRESS NOTES
Encounter Date:04/04/2024  Chief Complaint:   Subjective    Solomon Perez is a 78 y.o. male who presents to [facility] [place] today for three month cardiac follow-up. He has been doing fairly well.      Coronary Artery Disease    Hypertension    Atrial Fibrillation  Past medical history includes atrial fibrillation and CAD.      HPI       Coronary Artery Disease     Additional comments: No chest discomfort. Stays active. Rides horses almost every week. Hx  anterior MI, SCA, LAD stent 2010.                  Hypertension     Additional comments: Sometimes feels dizzy after taking morning meds about 2-3 times per week - doesn't last long. No further falls other than when leaned over to  his phone a few weeks ago. Doesn't check BP or HR at home. No nosebleeds or swelling.              Atrial Fibrillation     Additional comments: No palpitations - couldn't tell when in afib. Paroxysmal. No frequent falls. HX TIA 2017. Had MRI/MRA. Vision still comes and goes - sees eye doctor. On aspirin and Eliquis - doesn't miss any doses.             THORACIC AORTIC ANEURYSM     Additional comments: Unchanged per 4/2023 CT scan.             Follow-up     Additional comments: 3 MO F/U - last visit 1-18-24          Last edited by Samantha Castaneda APRN on 4/4/2024 10:14 AM.        History: Past medical, surgical, family, and social history reviewed.    Outpatient Medications Marked as Taking for the 4/4/24 encounter (Office Visit) with Samantha Castaneda APRN   Medication Sig Dispense Refill    aspirin 81 MG tablet Take 1 tablet by mouth Daily. 30 tablet 11    cetirizine (zyrTEC) 10 MG tablet Take 0.5-1 tablets by mouth Daily As Needed for Allergies. Unsure of dosage      cholecalciferol (VITAMIN D3) 25 MCG (1000 UT) tablet Take 2 tablets by mouth Daily.      Eliquis 5 MG tablet tablet TAKE 1 TABLET EVERY 12 HOURS 180 tablet 3    metoprolol succinate XL (TOPROL-XL) 25 MG 24 hr tablet Take 0.5  "tablets by mouth Every Night. TAKE ONE-HALF (1/2) TABLET DAILY      nitroglycerin (NITROSTAT) 0.4 MG SL tablet Place 1 tablet under the tongue Every 5 (Five) Minutes As Needed for Chest Pain. Take no more than 3 doses in 15 minutes. 25 tablet 11    pantoprazole (PROTONIX) 40 MG EC tablet TAKE 1 TABLET DAILY 90 tablet 3    simvastatin (ZOCOR) 20 MG tablet TAKE 1 TABLET EVERY NIGHT 90 tablet 3    vitamin C (ASCORBIC ACID) 250 MG tablet Take 1 tablet by mouth Daily. Unsure of dose      Zinc 100 MG tablet Take  by mouth Daily. Unsure of dose      [DISCONTINUED] metoprolol succinate XL (TOPROL-XL) 25 MG 24 hr tablet TAKE ONE-HALF (1/2) TABLET DAILY 45 tablet 3        Objective     Vital Signs:   /72 (BP Location: Left arm, Patient Position: Sitting, Cuff Size: Adult)   Pulse 74   Ht 182.9 cm (72.01\")   Wt 77.1 kg (169 lb 14.4 oz)   SpO2 99%   BMI 23.04 kg/m²   Wt Readings from Last 3 Encounters:   04/04/24 77.1 kg (169 lb 14.4 oz)   01/18/24 78.4 kg (172 lb 12.8 oz)   04/26/23 79.4 kg (175 lb)         Vitals reviewed.   Constitutional:       Appearance: Well-developed.   Eyes:      General: No scleral icterus.  Neck:      Vascular: No JVD.   Pulmonary:      Effort: Pulmonary effort is normal.      Breath sounds: Examination of the right-lower field reveals rales. No wheezing. No rales.   Cardiovascular:      Normal rate. Regular rhythm.      No gallop.    Pulses:     Intact distal pulses.   Edema:     Peripheral edema absent.   Musculoskeletal:      Left hand: Deformity (L fingers contracted - he can straighten with other hand, first finger partially amputated - work injury at [employer]) present. Skin:     General: Skin is warm and dry.      Findings: Bruising (BUEs) present.   Neurological:      Mental Status: Alert and oriented to person, place, and time.         Result Review  Data Reviewed:  The following data was reviewed by: TAMELA Archibald on 04/04/2024  Scan on 1/4/2024 1220 by Leonel, " Samantha Menjivar, APRN: SHARMAINE/JENNIFER/HUNTER/ JULIAN/ 1-2-2024     Lab Results - Last 18 Months   Lab Units 04/26/23  1407   CREATININE mg/dL 1.20                  Assessment and Plan   Problem List Items Addressed This Visit          Cardiac and Vasculature    Coronary artery disease involving native coronary artery of native heart without angina pectoris - Primary (Chronic)    Overview     Anterior MI, SCA, LAD stent 2010, [facility], Dr. Schaefer         Current Assessment & Plan     Remains stable. Continue present therapy. Again encouraged healthy lifestyle. Continue aspirin (especially if off Eliquis for procedures) if at all possible given hx of LAD stent. Call if any worsening.         Relevant Medications    metoprolol succinate XL (TOPROL-XL) 25 MG 24 hr tablet    Dyslipidemia (Chronic)    Overview     5/31/23 , TG 61, HDL 53, LDL 60         Current Assessment & Plan     Well controlled on simvastatin per 5/2023 labs. Again encouraged healthy diet, continued routine aerobic activity, and to get labs checked at least yearly. Encouraged him to see PCP soon for annual Wellness visit and labs. He declined for me to order any labs today.         Essential hypertension (Chronic)    Current Assessment & Plan     Remains very well controlled with medications. Continue present therapy but change Toprol to bedtime to see if improves occasional dizziness. Advised him to check BP and HR as soon as possible if has any further falls. Given balance issues and falls, I previously recommended neurology evaluation or at least PT evaluation and treatment - he declined both.         Relevant Medications    metoprolol succinate XL (TOPROL-XL) 25 MG 24 hr tablet    Paroxysmal atrial fibrillation (Chronic)    Current Assessment & Plan     Paroxysmal, maintaining sinus rhythm. Previously discussed increased risk of occurrence especially with continued excessive alcohol consumption. Continue aspirin and Eliquis for now. Discussed increased risk  of possibly life threatening brain bleed if he falls and hits his head. Recommended he consider Watchman NORIS closure device be implanted to decrease this risk given balance issues especially if begins to have frequent falls. He previously declined. Again advised him to go to ER if falls and hits his head. He will call if no improvement or any worsening or if he changes his mind.         Relevant Medications    metoprolol succinate XL (TOPROL-XL) 25 MG 24 hr tablet    Thoracic aortic aneurysm without rupture (Chronic)    Overview     4.1 cm ascending TAA 4/22 CTA chest followed by CTS  4.1 x 3.9 cm 4/2023 CTA         Current Assessment & Plan     Remains stable. Continue optimal BP control and avoid straining. CT chest ordered to be done later this month or early May.            Mental Health    History of alcohol abuse (Chronic)    Current Assessment & Plan     Encouraged him to try to quit again or at least cut back to no more than 2 drinks per day.            Neuro    History of TIA (transient ischemic attack) (Chronic)       Tobacco    Tobacco abuse (Chronic)    Current Assessment & Plan     Again encouraged complete smoking cessation. He remains unready to set a quit date or cut back any further.             Patient was given instructions and counseling regarding his condition or for health maintenance advice. Please see specific information pulled into the AVS if appropriate.    Follow Up :   Return in about 6 months (around 10/4/2024) for Recheck.                  TAMELA Tyler, ACNP-BC, CHFN-BC

## 2024-04-04 NOTE — ASSESSMENT & PLAN NOTE
Paroxysmal, maintaining sinus rhythm. Previously discussed increased risk of occurrence especially with continued excessive alcohol consumption. Continue aspirin and Eliquis for now. Discussed increased risk of possibly life threatening brain bleed if he falls and hits his head. Recommended he consider Watchman NORIS closure device be implanted to decrease this risk given balance issues especially if begins to have frequent falls. He previously declined. Again advised him to go to ER if falls and hits his head. He will call if no improvement or any worsening or if he changes his mind.

## 2024-04-04 NOTE — ASSESSMENT & PLAN NOTE
Remains stable. Continue present therapy. Again encouraged healthy lifestyle. Continue aspirin (especially if off Eliquis for procedures) if at all possible given hx of LAD stent. Call if any worsening.

## 2024-04-04 NOTE — ASSESSMENT & PLAN NOTE
Well controlled on simvastatin per 5/2023 labs. Again encouraged healthy diet, continued routine aerobic activity, and to get labs checked at least yearly. Encouraged him to see PCP soon for annual Wellness visit and labs. He declined for me to order any labs today.

## 2024-04-30 ENCOUNTER — OFFICE VISIT (OUTPATIENT)
Dept: CARDIAC SURGERY | Facility: CLINIC | Age: 79
End: 2024-04-30
Payer: MEDICARE

## 2024-04-30 ENCOUNTER — HOSPITAL ENCOUNTER (OUTPATIENT)
Dept: CT IMAGING | Facility: HOSPITAL | Age: 79
Discharge: HOME | End: 2024-04-30
Payer: MEDICARE

## 2024-04-30 VITALS
BODY MASS INDEX: 23.43 KG/M2 | SYSTOLIC BLOOD PRESSURE: 116 MMHG | DIASTOLIC BLOOD PRESSURE: 62 MMHG | WEIGHT: 173 LBS | HEART RATE: 72 BPM | HEIGHT: 72 IN | OXYGEN SATURATION: 96 %

## 2024-04-30 DIAGNOSIS — Z72.0 TOBACCO ABUSE: Chronic | ICD-10-CM

## 2024-04-30 DIAGNOSIS — I71.20 THORACIC AORTIC ANEURYSM WITHOUT RUPTURE, UNSPECIFIED PART: Chronic | ICD-10-CM

## 2024-04-30 DIAGNOSIS — I77.810 AORTIC ROOT DILATION: ICD-10-CM

## 2024-04-30 DIAGNOSIS — I71.21 ANEURYSM OF ASCENDING AORTA WITHOUT RUPTURE: Primary | Chronic | ICD-10-CM

## 2024-04-30 LAB — CREAT BLDA-MCNC: 1.1 MG/DL (ref 0.6–1.3)

## 2024-04-30 PROCEDURE — 71275 CT ANGIOGRAPHY CHEST: CPT

## 2024-04-30 PROCEDURE — 82565 ASSAY OF CREATININE: CPT

## 2024-04-30 PROCEDURE — 25510000001 IOPAMIDOL PER 1 ML

## 2024-04-30 RX ADMIN — IOPAMIDOL 100 ML: 755 INJECTION, SOLUTION INTRAVENOUS at 11:22

## 2024-04-30 NOTE — PROGRESS NOTES
"  Chief Complaint   Patient presents with    Thoracic Aneurysm     Patient is here for follow up w/CT        Subjective     History of Present Illness  Mr. Perez is a 79-year-old male who presents today in follow-up of an ascending aortic aneurysm and aortic root aneurysm.  Mr. Perez reports that he is overall doing well today.  He reports he is currently being worked up for having several falls but reports that he has not fallen since New Year's.  He sees TAMELA Tyler for cardiology.  He reports that she has sent information to his PCP requesting a neurology consult.  He has no other new health concerns.  He denies back or chest pain.  He reports that his blood pressure has been well-controlled.  He is currently smoking 1/2 pack of cigarettes a day. He is a current everyday beer drinker.  When asked how many drinks he has a day he states \"it depends\".  He stated if he left the office right now he would have time to get in 6-7 beers.    Review of Systems   Constitutional:  Negative for chills, fatigue, fever and unexpected weight change.   HENT:  Negative for sore throat and trouble swallowing.    Respiratory:  Negative for shortness of breath.    Cardiovascular:  Negative for chest pain, palpitations and leg swelling.   Musculoskeletal:  Negative for back pain.   Skin:  Negative for color change, pallor and rash.   Neurological:         Positive for falls        Past Medical History:   Diagnosis Date    Aneurysm     Atrial fibrillation by electrocardiogram     Bilateral carotid bruits     Chest pain     Coronary artery disease involving native coronary artery of native heart with angina pectoris      5/2015- ECHO: EF 60-65% 5/2015- DSE: negative for ischemia. 2012- Stress: negative for ischemia.    Expressive dysphasia 11/16/2017    History of essential hypertension     History of PTCA     12/2010- drug-eluting stent to the LAD.    Hyperlipidemia, mixed     Malaise and fatigue     MI, old     2010, LAD " stent    Peptic ulcer     NOS    Transient cerebral ischemia 11/16/2017     Past Surgical History:   Procedure Laterality Date    BACK SURGERY      COLONOSCOPY  2023    Dr. Bowden    CORONARY STENT PLACEMENT  12/2010    HIP SURGERY Right 01/2022    SINUS SURGERY  04/2012    TOTAL HIP ARTHROPLASTY Right 09/2022    Dr. [place], [facility] [place]     Family History   Problem Relation Age of Onset    Coronary artery disease Other     Colon cancer Mother     Heart attack Father     Heart failure Father      Social History     Tobacco Use    Smoking status: Every Day     Current packs/day: 0.50     Average packs/day: 0.5 packs/day for 69.3 years (34.7 ttl pk-yrs)     Types: Cigarettes     Start date: 1955     Passive exposure: Past    Smokeless tobacco: Former     Types: Snuff, Chew     Quit date: 2000    Tobacco comments:     Pt has gone back and forth with smoking since about 1955. Currently smokes about 0.5 ppd, down from 0.75 ppd   Vaping Use    Vaping status: Never Used   Substance Use Topics    Alcohol use: Not Currently     Alcohol/week: 36.0 standard drinks of alcohol     Types: 36 Cans of beer per week     Comment: drinking about 5-7 beers a day except on Sundays - depends on the day. Sometimes more. Has quit/cut back for a short time.    Drug use: Never     Current Outpatient Medications   Medication Sig Dispense Refill    aspirin 81 MG tablet Take 1 tablet by mouth Daily. 30 tablet 11    cetirizine (zyrTEC) 10 MG tablet Take 0.5-1 tablets by mouth Daily As Needed for Allergies. Unsure of dosage      cholecalciferol (VITAMIN D3) 25 MCG (1000 UT) tablet Take 2 tablets by mouth Daily.      Eliquis 5 MG tablet tablet TAKE 1 TABLET EVERY 12 HOURS 180 tablet 3    HYDROcodone-acetaminophen (NORCO)  MG per tablet Take 1 tablet by mouth Every 4 (Four) Hours As Needed.      metoprolol succinate XL (TOPROL-XL) 25 MG 24 hr tablet Take 0.5 tablets by mouth Every Night. TAKE ONE-HALF (1/2) TABLET DAILY      pantoprazole (PROTONIX) 40  "MG EC tablet TAKE 1 TABLET DAILY 90 tablet 3    simvastatin (ZOCOR) 20 MG tablet TAKE 1 TABLET EVERY NIGHT 90 tablet 3    vitamin C (ASCORBIC ACID) 250 MG tablet Take 1 tablet by mouth Daily. Unsure of dose      Zinc 100 MG tablet Take  by mouth Daily. Unsure of dose      nitroglycerin (NITROSTAT) 0.4 MG SL tablet Place 1 tablet under the tongue Every 5 (Five) Minutes As Needed for Chest Pain. Take no more than 3 doses in 15 minutes. 25 tablet 11     No current facility-administered medications for this visit.     Allergies:  Patient has no known allergies.    Objective      Vital Signs  Visit Vitals  /62 (BP Location: Right arm, Patient Position: Sitting, Cuff Size: Adult)   Pulse 72   Ht 182.9 cm (72.01\")   Wt 78.5 kg (173 lb)   SpO2 96%   BMI 23.46 kg/m²         Physical Exam  Constitutional:       General: He is not in acute distress.  HENT:      Head: Normocephalic and atraumatic.   Eyes:      Pupils: Pupils are equal, round, and reactive to light.   Cardiovascular:      Rate and Rhythm: Normal rate and regular rhythm.      Heart sounds: No murmur heard.  Pulmonary:      Effort: Pulmonary effort is normal.      Breath sounds: Normal breath sounds. No wheezing, rhonchi or rales.   Abdominal:      General: There is no distension.      Palpations: Abdomen is soft.      Tenderness: There is no abdominal tenderness.   Musculoskeletal:         General: Normal range of motion.      Cervical back: Normal range of motion and neck supple.      Right lower leg: No edema.      Left lower leg: No edema.   Skin:     General: Skin is warm and dry.   Neurological:      General: No focal deficit present.      Mental Status: He is alert.   Psychiatric:         Mood and Affect: Mood normal.         Behavior: Behavior normal.       Results Review:   CT Angiogram Chest    Result Date: 4/30/2024  Narrative: EXAM/TECHNIQUE: CT chest angiography with 3D MIP images with IV contrast  INDICATION: Thoracic Aortic Aneurysm; " I71.20-Thoracic aortic aneurysm, without rupture, unspecified  COMPARISON: 4/26/2023  DLP: 241.49 mGy.cm. Automated exposure control was also utilized to decrease patient radiation dose.  FINDINGS:  Aortic root is mildly dilated measuring 4 cm diameter, unchanged from prior exam. The ascending aorta is dilated measuring 4.2 cm diameter, unchanged from prior exam. Aortic arch and descending thoracic aorta are nondilated and contain scattered atherosclerotic calcification. Three-vessel aortic arch with widely patent branch origins. Heavy three-vessel coronary artery atherosclerotic calcification. No pericardial effusion. No central pulmonary artery filling defect.  The central airways are clear. No consolidation or pleural effusion. No advanced emphysematous or fibrotic changes. No suspicious pulmonary nodule. No enlarged axillary, supraclavicular, or mediastinal lymph nodes. No change in borderline prominent right hilar lymph node.  No large thyroid nodule. No acute chest wall soft tissue abnormality. No acute finding in the included portion of the upper abdomen. Multilevel thoracic spine degenerative change. No acute osseous finding.      Impression:  No change in mild dilatation of the aortic root (4 cm) and dilatation of the ascending aorta (4.2 cm).  This report was signed and finalized on 4/30/2024 11:35 AM by Dr. Percy Riddle MD.       Personal interpretation of radiographic imaging:  The ascending aorta measures 4.1 cm in maximum dimension and the aortic root measures 4.0 cm in size without rupture or dissection.       Assessment & Plan       Diagnoses and all orders for this visit:    1. Aneurysm of ascending aorta without rupture (Primary)    2. Aortic root dilation    3. Tobacco abuse          Overall Mr. Perez is doing well today.  He returns today in follow-up of a thoracic aortic aneurysm.  On CT imaging today the ascending aorta measures 4.1 cm in maximum dimension and the aortic root measures 4.0  cm in size without rupture or dissection.  He denies back or chest pain.  He reports that his blood pressure has been well-controlled.  He does continue to smoke 1/2 pack cigarettes a day and is a current every day beer drinker.    We discussed the natural course history of aortic aneurysmal disease. We discussed the specific size of aneurysm today and potential risk of aortic complications. We discussed the operative treatment of aneursymal disease broadly. At this time we discussed the recommendation to plan surveillance with CT scans. I will have him return in 1 year with CT angiogram of the chest.     We discussed signs and symptoms of acute aortic pathology and the need to present to the emergency department for further evaluation. Lastly, we discussed the value of exercise while being mindful of a known aneurysm and the potential risk that high intensity, isometric, or valsalva type exercises presents.     Potential medical therapy including the use of a beta-blocker and perhaps other agents to accomplish strict control of pressure were discussed.     Solomon Perez  reports that he has been smoking cigarettes. He started smoking about 69 years ago. He has a 34.7 pack-year smoking history. He has been exposed to tobacco smoke. He quit smokeless tobacco use about 24 years ago.  His smokeless tobacco use included snuff and chew. I have educated him on the risk of diseases from using tobacco products such as cancer, COPD, and heart disease.     Advance Care Planning   ACP discussion was declined by the patient. Patient does not have an advance directive, declines further assistance.       TAMELA Almazan

## 2024-05-01 PROBLEM — I77.810 AORTIC ROOT DILATION: Status: ACTIVE | Noted: 2024-05-01

## 2024-10-01 DIAGNOSIS — E78.5 DYSLIPIDEMIA: ICD-10-CM

## 2024-10-01 DIAGNOSIS — I10 ESSENTIAL HYPERTENSION: Chronic | ICD-10-CM

## 2024-10-01 DIAGNOSIS — I25.10 CORONARY ARTERY DISEASE INVOLVING NATIVE CORONARY ARTERY OF NATIVE HEART WITHOUT ANGINA PECTORIS: Chronic | ICD-10-CM

## 2024-10-01 DIAGNOSIS — I48.0 PAROXYSMAL ATRIAL FIBRILLATION: ICD-10-CM

## 2024-10-02 RX ORDER — SIMVASTATIN 20 MG
20 TABLET ORAL NIGHTLY
Qty: 90 TABLET | Refills: 3 | Status: SHIPPED | OUTPATIENT
Start: 2024-10-02

## 2024-10-02 RX ORDER — METOPROLOL SUCCINATE 25 MG/1
12.5 TABLET, EXTENDED RELEASE ORAL NIGHTLY
Qty: 45 TABLET | Refills: 3 | Status: SHIPPED | OUTPATIENT
Start: 2024-10-02 | End: 2025-10-02

## 2024-10-02 RX ORDER — PANTOPRAZOLE SODIUM 40 MG/1
40 TABLET, DELAYED RELEASE ORAL DAILY
Qty: 90 TABLET | Refills: 3 | Status: SHIPPED | OUTPATIENT
Start: 2024-10-02

## 2024-10-03 ENCOUNTER — OFFICE VISIT (OUTPATIENT)
Dept: CARDIOLOGY | Facility: CLINIC | Age: 79
End: 2024-10-03
Payer: MEDICARE

## 2024-10-03 ENCOUNTER — TELEPHONE (OUTPATIENT)
Dept: CARDIOLOGY | Facility: CLINIC | Age: 79
End: 2024-10-03
Payer: MEDICARE

## 2024-10-03 VITALS
HEIGHT: 72 IN | WEIGHT: 173 LBS | SYSTOLIC BLOOD PRESSURE: 122 MMHG | DIASTOLIC BLOOD PRESSURE: 74 MMHG | OXYGEN SATURATION: 99 % | BODY MASS INDEX: 23.43 KG/M2 | HEART RATE: 78 BPM

## 2024-10-03 DIAGNOSIS — I71.21 ANEURYSM OF ASCENDING AORTA WITHOUT RUPTURE: Chronic | ICD-10-CM

## 2024-10-03 DIAGNOSIS — I77.810 AORTIC ROOT DILATION: ICD-10-CM

## 2024-10-03 DIAGNOSIS — I25.10 CORONARY ARTERY DISEASE INVOLVING NATIVE CORONARY ARTERY OF NATIVE HEART WITHOUT ANGINA PECTORIS: Primary | Chronic | ICD-10-CM

## 2024-10-03 DIAGNOSIS — I10 ESSENTIAL HYPERTENSION: Chronic | ICD-10-CM

## 2024-10-03 DIAGNOSIS — Z72.0 TOBACCO ABUSE: Chronic | ICD-10-CM

## 2024-10-03 DIAGNOSIS — I48.0 PAROXYSMAL ATRIAL FIBRILLATION: Chronic | ICD-10-CM

## 2024-10-03 DIAGNOSIS — E78.5 DYSLIPIDEMIA: Chronic | ICD-10-CM

## 2024-10-03 DIAGNOSIS — Z86.73 HISTORY OF TIA (TRANSIENT ISCHEMIC ATTACK): Chronic | ICD-10-CM

## 2024-10-03 DIAGNOSIS — F10.11 HISTORY OF ALCOHOL ABUSE: Chronic | ICD-10-CM

## 2024-10-03 RX ORDER — ACETAMINOPHEN 325 MG/1
650 TABLET ORAL EVERY 6 HOURS PRN
COMMUNITY

## 2024-10-03 NOTE — PROGRESS NOTES
"Encounter Date:10/03/2024  Chief Complaint:   Subjective    Solomon Perez is a 79 y.o. male who presents to [facility] [place] today for six month cardiac follow-up.      Coronary Artery Disease    Hypertension    Atrial Fibrillation  Past medical history includes atrial fibrillation and CAD.   Follow-up  Aortic Aneurysm       HPI       Coronary Artery Disease     Additional comments: No chest discomfort. HX anterior MI, SCA, LAD stent 2010. Stays active - rides horses 2-3 times per week, mows. Weed eats but has to take breaks after 15-20 minutes due to back pain.             Hypertension     Additional comments: Well controlled. Feels dizzy \"pretty often\". BP was \"fine\". No nosebleeds or swelling.             Atrial Fibrillation     Additional comments: Paroxysmal. No palpitations but couldn't tell when in afib in the past. No frequent falls. Hx TIA 2017. Had MRI/MRA. Vision still comes and goes but not very long - sees eye doctor. On aspirin and Eliquis.             Follow-up     Additional comments: 6 mo              cardiac clearance     Additional comments: From [facility]- stopping Eliquis tomorrow prior to sebaceous cyst removal. 3 days prior- surgery Monday              Aortic Aneurysm     Additional comments: Stable per 5/2024 CTA chest - CTS following.          Last edited by Samantha Castaneda APRN on 10/3/2024  1:49 PM.        History: Past medical, surgical, family, and social history reviewed.    Outpatient Medications Marked as Taking for the 10/3/24 encounter (Office Visit) with Samantha Castaneda APRN   Medication Sig Dispense Refill    acetaminophen (TYLENOL) 325 MG tablet Take 2 tablets by mouth Every 6 (Six) Hours As Needed for Mild Pain, Headache or Fever. At least once a day, sometimes twice      apixaban (Eliquis) 5 MG tablet tablet Take 1 tablet by mouth Every 12 (Twelve) Hours. 180 tablet 3    aspirin 81 MG tablet Take 1 tablet by mouth Daily. 30 tablet " "11    cetirizine (zyrTEC) 10 MG tablet Take 0.5-1 tablets by mouth Daily As Needed for Allergies. Unsure of dosage      cholecalciferol (VITAMIN D3) 25 MCG (1000 UT) tablet Take 0.5 tablets by mouth Daily. 500 mg 10-3-24      metoprolol succinate XL (TOPROL-XL) 25 MG 24 hr tablet Take 0.5 tablets by mouth Every Night. TAKE ONE-HALF (1/2) TABLET DAILY 45 tablet 3    nitroglycerin (NITROSTAT) 0.4 MG SL tablet Place 1 tablet under the tongue Every 5 (Five) Minutes As Needed for Chest Pain. Take no more than 3 doses in 15 minutes. 25 tablet 11    pantoprazole (PROTONIX) 40 MG EC tablet Take 1 tablet by mouth Daily. 90 tablet 3    simvastatin (ZOCOR) 20 MG tablet Take 1 tablet by mouth Every Night. 90 tablet 3    vitamin C (ASCORBIC ACID) 250 MG tablet Take 4 tablets by mouth Daily. *1000 as of 10-3-2024      Zinc 50 MG tablet Take 1 tablet by mouth Daily. *50 mg as of 10-3-24      [DISCONTINUED] HYDROcodone-acetaminophen (NORCO)  MG per tablet Take 1 tablet by mouth Every 4 (Four) Hours As Needed.          Objective     Vital Signs:   /74 (BP Location: Left arm, Patient Position: Sitting, Cuff Size: Adult)   Pulse 78   Ht 182.9 cm (72.01\")   Wt 78.5 kg (173 lb)   SpO2 99%   BMI 23.46 kg/m²   Wt Readings from Last 3 Encounters:   10/03/24 78.5 kg (173 lb)   04/30/24 78.5 kg (173 lb)   04/04/24 77.1 kg (169 lb 14.4 oz)         Vitals reviewed.   Constitutional:       Appearance: Well-developed.   Eyes:      General: No scleral icterus.  Neck:      Vascular: No JVD.   Pulmonary:      Effort: Pulmonary effort is normal.      Breath sounds: Examination of the right-lower field reveals rales. No wheezing. Rales (doesn't clear with coughing) present.   Cardiovascular:      Normal rate. Regular rhythm.      No gallop.    Pulses:     Intact distal pulses.   Edema:     Peripheral edema absent.   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 Skin:     General: Skin is warm and dry.      Findings: Bruising (BUEs) present.   Neurological:      Mental Status: Alert and oriented to person, place, and time.         Result Review  Data Reviewed:  The following data was reviewed by: TAMELA Archibald on 10/03/2024  CT Angiogram Chest (04/30/2024 11:21)   Lab Results - Last 18 Months   Lab Units 04/30/24  1118 04/26/23  1407   CREATININE mg/dL 1.10 1.20                  Assessment and Plan   Problem List Items Addressed This Visit          Cardiac and Vasculature    Coronary artery disease involving native coronary artery of native heart without angina pectoris - Primary (Chronic)    Overview     Anterior MI, SCA, LAD stent 2010, [facility], Dr. Schaefer         Current Assessment & Plan     Remains stable. Continue present therapy. Again encouraged healthy lifestyle. Continue aspirin (especially if off Eliquis for procedures) if at all possible given hx of LAD stent. Call if any worsening.         Dyslipidemia (Chronic)    Overview     5/31/23 , TG 61, HDL 53, LDL 60         Current Assessment & Plan     Well controlled on simvastatin per 5/2023 labs. Again encouraged healthy diet, continued routine aerobic activity, and to get labs checked at least yearly. Again encouraged him to see PCP soon for annual Wellness visit and labs. He declined for me to order any labs today.         Essential hypertension (Chronic)    Current Assessment & Plan     Remains very well controlled with medications. Continue Toprol at bedtime. Encouraged him to check BP and HR as soon as possible if has any further falls or near falls. Given balance issues and falls, I previously recommended neurology evaluation or at least PT evaluation and treatment - he declined both.         Paroxysmal atrial fibrillation (Chronic)    Current Assessment & Plan     Paroxysmal, maintaining sinus rhythm. Previously discussed increased risk  of occurrence especially with continued excessive alcohol consumption. Continue aspirin and Eliquis for now. Reasonable to hold for procedures/surgeries and resume when safe to do so. Discussed increased risk of possibly life threatening brain bleed if he falls and hits his head. Recommended he consider Watchman NORIS closure device be implanted to decrease this risk given balance issues especially if begins to have frequent falls. He previously declined. Again advised him to go to ER if falls and hits his head. He will call if no improvement or any worsening or if he changes his mind.         Thoracic aortic aneurysm without rupture (Chronic)    Overview     4.1 cm ascending TAA 4/22 CTA chest   4.1 x 3.9 cm 4/2023 CTA  4.2 cm ascending, 4.0 cm root 4/2024 CTA chest         Current Assessment & Plan     Remains stable. Followed by CTS. Continue optimal BP control and avoid straining. CTA chest ordered to be done next year.         Aortic root dilation    Overview     4.0 cm 5/2024 (unchanged)         Current Assessment & Plan     CTS following            Mental Health    History of alcohol abuse (Chronic)    Current Assessment & Plan     Again encouraged him to try to quit again or at least cut back to no more than 2 drinks per day. Discussed risks of excessive alcohol consumption.            Neuro    History of TIA (transient ischemic attack) (Chronic)    Current Assessment & Plan     Continue aspirin and Eliquis.            Tobacco    Tobacco abuse (Chronic)    Current Assessment & Plan     Again encouraged complete smoking cessation. He remains unready to set a quit date or cut back any further.           Advance Care Planning   ACP discussion was held with the patient during this visit. Patient has an advance directive (not in EMR), copy requested.       Patient was given instructions and counseling regarding his condition or for health maintenance advice. Please see specific information pulled into the AVS if  appropriate.    Follow Up :   Return in about 6 months (around 4/3/2025) for Recheck.                  Samantha Castaneda, APRN, ACNP-BC, CHFN-BC

## 2024-10-03 NOTE — TELEPHONE ENCOUNTER
Pt called back this afternoon to let Samantha know a few doses of supplements that he takes.    Zinc 50mg once daily  Vit C 1000mg once daily  Vit D 500mg once daily

## 2024-10-04 NOTE — ASSESSMENT & PLAN NOTE
Again encouraged him to try to quit again or at least cut back to no more than 2 drinks per day. Discussed risks of excessive alcohol consumption.

## 2024-10-04 NOTE — ASSESSMENT & PLAN NOTE
Paroxysmal, maintaining sinus rhythm. Previously discussed increased risk of occurrence especially with continued excessive alcohol consumption. Continue aspirin and Eliquis for now. Reasonable to hold for procedures/surgeries and resume when safe to do so. Discussed increased risk of possibly life threatening brain bleed if he falls and hits his head. Recommended he consider Watchman NORIS closure device be implanted to decrease this risk given balance issues especially if begins to have frequent falls. He previously declined. Again advised him to go to ER if falls and hits his head. He will call if no improvement or any worsening or if he changes his mind.

## 2024-10-04 NOTE — ASSESSMENT & PLAN NOTE
Well controlled on simvastatin per 5/2023 labs. Again encouraged healthy diet, continued routine aerobic activity, and to get labs checked at least yearly. Again encouraged him to see PCP soon for annual Wellness visit and labs. He declined for me to order any labs today.

## 2024-10-04 NOTE — ASSESSMENT & PLAN NOTE
Remains very well controlled with medications. Continue Toprol at bedtime. Encouraged him to check BP and HR as soon as possible if has any further falls or near falls. Given balance issues and falls, I previously recommended neurology evaluation or at least PT evaluation and treatment - he declined both.

## 2024-10-04 NOTE — ASSESSMENT & PLAN NOTE
Remains stable. Followed by CTS. Continue optimal BP control and avoid straining. CTA chest ordered to be done next year.

## 2025-01-03 ENCOUNTER — TELEPHONE (OUTPATIENT)
Dept: CARDIOLOGY | Facility: CLINIC | Age: 80
End: 2025-01-03

## 2025-01-03 NOTE — TELEPHONE ENCOUNTER
[facility] received a fax that requires your attention. The document has been indexed to the patient’s chart for your review.      Reason for sending: EXTERNAL MEDICAL RECORD NOTIFICATION     Documents Description: CARDIAC CLEARANCE REQ-[facility] GENERAL SURGERY-1.2.25    Name of Sender: [facility] GENERAL [facility]     Date Indexed: 1.2.25

## 2025-03-26 ENCOUNTER — OFFICE VISIT (OUTPATIENT)
Dept: CARDIOLOGY | Facility: CLINIC | Age: 80
End: 2025-03-26
Payer: MEDICARE

## 2025-03-26 VITALS
OXYGEN SATURATION: 99 % | HEIGHT: 72 IN | WEIGHT: 173 LBS | DIASTOLIC BLOOD PRESSURE: 64 MMHG | HEART RATE: 66 BPM | SYSTOLIC BLOOD PRESSURE: 118 MMHG | BODY MASS INDEX: 23.43 KG/M2

## 2025-03-26 DIAGNOSIS — I10 ESSENTIAL HYPERTENSION: Chronic | ICD-10-CM

## 2025-03-26 DIAGNOSIS — I71.21 ANEURYSM OF ASCENDING AORTA WITHOUT RUPTURE: Chronic | ICD-10-CM

## 2025-03-26 DIAGNOSIS — Z72.0 TOBACCO ABUSE: Chronic | ICD-10-CM

## 2025-03-26 DIAGNOSIS — E78.5 DYSLIPIDEMIA: Chronic | ICD-10-CM

## 2025-03-26 DIAGNOSIS — I77.810 AORTIC ROOT DILATION: ICD-10-CM

## 2025-03-26 DIAGNOSIS — Z86.73 HISTORY OF TIA (TRANSIENT ISCHEMIC ATTACK): Chronic | ICD-10-CM

## 2025-03-26 DIAGNOSIS — I48.0 PAROXYSMAL ATRIAL FIBRILLATION: Chronic | ICD-10-CM

## 2025-03-26 DIAGNOSIS — I25.10 CORONARY ARTERY DISEASE INVOLVING NATIVE CORONARY ARTERY OF NATIVE HEART WITHOUT ANGINA PECTORIS: Primary | Chronic | ICD-10-CM

## 2025-03-26 DIAGNOSIS — F10.11 HISTORY OF ALCOHOL ABUSE: Chronic | ICD-10-CM

## 2025-03-26 DIAGNOSIS — Z79.01 CHRONIC ANTICOAGULATION: ICD-10-CM

## 2025-03-26 RX ORDER — NITROGLYCERIN 0.4 MG/1
0.4 TABLET SUBLINGUAL
Qty: 25 TABLET | Refills: 3 | Status: SHIPPED | OUTPATIENT
Start: 2025-03-26 | End: 2026-03-26

## 2025-03-26 NOTE — PROGRESS NOTES
"  Subjective:     Encounter Date:03/26/2025      Patient ID: Solomon Perez is a 79 y.o. male with coronary artery disease s/p anterior MI with LAD stent 2010, hypertension, hyperlipidemia, paroxysmal atrial fibrillation, chronic anticoagulation, thoracic aortic aneurysm, aortic root dilation, hx of TIA, and hx of alcohol abuse.    Chief Complaint:  no complaints  History of Present Illness  Patient presents today for management of coronary artery disease. Today he reports that he has had issues with blacking out for many years. It starts with his right eye going black and then going to his left. He reports that he doesn't loose full consciousness. He reports that it lasts seconds to a couple of minutes and then resolves. He denies any chest pain. He reports some dyspnea on exertion that has been unchanged. He reports some intermittent dizziness; he feels like this is related to dehydration. He has been drinking liquid IV (1 a day) and his dizziness has improved. He denies any leg swelling, orthopnea or PND. He denies any heart racing or palpitations. His BP has remained well controlled at other dr visits. Patient is on eliquis and denies any bleeding issues. Patient follows with Michelle RAPP as PCP.     The following portions of the patient's history were reviewed and updated as appropriate: allergies, current medications, past family history, past medical history, past social history, past surgical history and problem list.    Allergies   Allergen Reactions    Tramadol Other (See Comments)     Patient stated he had \"weird\" side effects       Current Outpatient Medications:     acetaminophen (TYLENOL) 325 MG tablet, Take 2 tablets by mouth Every 6 (Six) Hours As Needed for Mild Pain, Headache or Fever. At least once a day, sometimes twice, Disp: , Rfl:     apixaban (Eliquis) 5 MG tablet tablet, Take 1 tablet by mouth Every 12 (Twelve) Hours., Disp: 180 tablet, Rfl: 3    aspirin 81 MG tablet, Take 1 tablet by " mouth Daily., Disp: 30 tablet, Rfl: 11    cetirizine (zyrTEC) 10 MG tablet, Take 0.5-1 tablets by mouth Daily As Needed for Allergies. Unsure of dosage, Disp: , Rfl:     cholecalciferol (VITAMIN D3) 25 MCG (1000 UT) tablet, Take 0.5 tablets by mouth Daily. 500 mg 10-3-24, Disp: , Rfl:     metoprolol succinate XL (TOPROL-XL) 25 MG 24 hr tablet, Take 0.5 tablets by mouth Every Night. TAKE ONE-HALF (1/2) TABLET DAILY, Disp: 45 tablet, Rfl: 3    nitroglycerin (Nitrostat) 0.4 MG SL tablet, Place 1 tablet under the tongue Every 5 (Five) Minutes As Needed for Chest Pain. Take no more than 3 doses in 15 minutes., Disp: 25 tablet, Rfl: 3    pantoprazole (PROTONIX) 40 MG EC tablet, Take 1 tablet by mouth Daily., Disp: 90 tablet, Rfl: 3    simvastatin (ZOCOR) 20 MG tablet, Take 1 tablet by mouth Every Night., Disp: 90 tablet, Rfl: 3    vitamin C (ASCORBIC ACID) 250 MG tablet, Take 4 tablets by mouth Daily. *1000 as of 10-3-2024, Disp: , Rfl:     Zinc 50 MG tablet, Take 1 tablet by mouth Daily. *50 mg as of 10-3-24, Disp: , Rfl:   Past Medical History:   Diagnosis Date    Aneurysm     Atrial fibrillation by electrocardiogram     Bilateral carotid bruits     Chest pain     Coronary artery disease involving native coronary artery of native heart with angina pectoris      5/2015- ECHO: EF 60-65% 5/2015- DSE: negative for ischemia. 2012- Stress: negative for ischemia.    Expressive dysphasia 11/16/2017    History of essential hypertension     History of PTCA     12/2010- drug-eluting stent to the LAD.    Hyperlipidemia, mixed     Malaise and fatigue     MI, old     2010, LAD stent    Peptic ulcer     NOS    Transient cerebral ischemia 11/16/2017     Social History     Socioeconomic History    Marital status:    Tobacco Use    Smoking status: Every Day     Current packs/day: 0.50     Average packs/day: 0.5 packs/day for 70.2 years (35.1 ttl pk-yrs)     Types: Cigarettes     Start date: 1955     Passive exposure: Past     Smokeless tobacco: Former     Types: Snuff, Chew     Quit date: 2000    Tobacco comments:     *less than a pack a day* Pt has gone back and forth with smoking since about 1955. Currently smokes about 0.5 ppd, down from 0.75 ppd   Vaping Use    Vaping status: Never Used   Substance and Sexual Activity    Alcohol use: Yes     Alcohol/week: 36.0 standard drinks of alcohol     Types: 36 Cans of beer per week     Comment: drinking about 5-7 beers a day except on Sundays - depends on the day. Sometimes more. Had quit/cut back for a short time.    Drug use: Never    Sexual activity: Defer       Review of Systems   Constitutional: Negative for malaise/fatigue.   Cardiovascular:  Positive for dyspnea on exertion. Negative for chest pain, irregular heartbeat, leg swelling, near-syncope, orthopnea, palpitations, paroxysmal nocturnal dyspnea and syncope.   Hematologic/Lymphatic: Does not bruise/bleed easily.   Genitourinary:  Negative for hematuria.   Neurological:  Positive for dizziness. Negative for weakness.   All other systems reviewed and are negative.         Objective:     Vitals reviewed.   Constitutional:       General: Not in acute distress.     Appearance: Normal appearance. Well-developed.   Eyes:      Pupils: Pupils are equal, round, and reactive to light.   HENT:      Head: Normocephalic and atraumatic.      Nose: Nose normal.   Neck:      Vascular: No carotid bruit.   Pulmonary:      Effort: Pulmonary effort is normal. No respiratory distress.      Breath sounds: Normal breath sounds. No wheezing. No rales.   Cardiovascular:      Normal rate. Regular rhythm.      Murmurs: There is no murmur.   Edema:     Peripheral edema absent.   Abdominal:      General: There is no distension.      Palpations: Abdomen is soft.   Musculoskeletal: Normal range of motion.      Cervical back: Normal range of motion and neck supple. Skin:     General: Skin is warm.      Findings: No erythema or rash.   Neurological:      General:  "No focal deficit present.      Mental Status: Alert and oriented to person, place, and time.   Psychiatric:         Attention and Perception: Attention normal.         Mood and Affect: Mood normal.         Speech: Speech normal.         Behavior: Behavior normal.         Thought Content: Thought content normal.         Judgment: Judgment normal.         /64 (BP Location: Left arm, Patient Position: Sitting, Cuff Size: Adult)   Pulse 66   Ht 182.9 cm (72.01\")   Wt 78.5 kg (173 lb)   SpO2 99%   BMI 23.46 kg/m²       ECG 12 Lead    Date/Time: 3/26/2025 2:12 PM  Performed by: Guicho Narvaez APRN    Authorized by: Guicho Narvaez APRN  Comparison: compared with previous ECG from 1/18/2024  Similar to previous ECG  Rhythm: sinus rhythm  Rate: normal  BPM: 67    Clinical impression: normal ECG          Lab Review:     Lipid panel 5/2023:        I have personally reviewed labs and past office notes prior to patients visit  Assessment:          Diagnosis Plan   1. Coronary artery disease involving native coronary artery of native heart without angina pectoris  nitroglycerin (Nitrostat) 0.4 MG SL tablet      2. Essential hypertension        3. Dyslipidemia        4. Paroxysmal atrial fibrillation        5. Chronic anticoagulation        6. Aneurysm of ascending aorta without rupture        7. Aortic root dilation        8. History of TIA (transient ischemic attack)        9. History of alcohol abuse        10. Tobacco abuse               Plan:       Coronary artery disease: s/p anterior MI with LAD stent 2010. Patient denies any chest pain. Continue aspirin, metoprolol and simvastatin.     2. Hypertension: well controlled. Continue current medications    3. Hyperlipidemia: lipid panel 5/2023 demonstrated well controlled cholesterol. Continue simvastatin.     4. Paroxysmal atrial fibrillation: maintaining NSR. EKG today shows NSR rate of 67. Continue metoprolol and eliquis    5. Chronic anticoagulation: patient " is on eliquis and denies any bleeding issues    6. Thoracic aortic aneurysm: CTA chest 4/2024 showed 4.2 cm ascending, 4.0 cm root. Patient follows with [facility]    7. Aortic root dilation: 4.0 cm CTA chest. Patient follows with [facility]    8. Hx of TIA    9. Hx of alcohol abuse    10. Tobacco abuse: Solomon Perez  reports that he has been smoking cigarettes. He started smoking about 70 years ago. He has a 35.1 pack-year smoking history. He has been exposed to tobacco smoke. He quit smokeless tobacco use about 25 years ago.  His smokeless tobacco use included snuff and chew. I have educated him on the risk of diseases from using tobacco products such as cancer, COPD, and heart disease. I advised him to quit and he is not willing to quit. I spent 3  minutes counseling the patient.    I spent 35 minutes caring for Solomon on this date of service. This time includes time spent by me in the following activities:preparing for the visit, reviewing tests, obtaining and/or reviewing a separately obtained history, performing a medically appropriate examination and/or evaluation , counseling and educating the patient/family/caregiver, and documenting information in the medical record     I spent 2 minutes on the separately reported service of EKG. This time is not included in the time used to support the E/M service also reported today.    Patient is to follow up in 6 months or sooner if needed

## 2025-04-29 PROBLEM — Q25.43 AORTIC ROOT ANEURYSM: Chronic | Status: ACTIVE | Noted: 2025-04-29

## 2025-04-29 PROBLEM — F17.210 CIGARETTE NICOTINE DEPENDENCE WITHOUT COMPLICATION: Status: ACTIVE | Noted: 2025-04-29

## 2025-04-29 PROBLEM — F17.210 CIGARETTE NICOTINE DEPENDENCE WITHOUT COMPLICATION: Chronic | Status: ACTIVE | Noted: 2025-04-29

## 2025-04-29 PROBLEM — Q25.43 AORTIC ROOT ANEURYSM: Status: ACTIVE | Noted: 2025-04-29

## 2025-04-29 NOTE — PROGRESS NOTES
TAMELA Ken  [facility] [place] [facility]  [address]   [address]            [place] [address]  Phone: [phone]  Fax: [phone]          Chief Complaint  Aortic Aneurysm (Patient is here for follow up w/CT)    Subjective     Solomon Perez presents to [facility] for appointment.    History of Present Illness  The patient presents today for a follow-up of an aneurysm.    He reports experiencing persistent shortness of breath with exertion. Walking from his truck to the clinic this morning caused significant shortness of breath. No known lung disease is reported, although he is a current smoker and has no plans to quit smoking. Shortness of breath is not experienced during daily activities but occurs with exertion, such as walking to the barn and back. No chest pain, heart palpitations, or leg swelling is reported. He does not report issues with his blood pressure.     He is able to lift up to 50 pounds, as evidenced by his ability to carry horse feed and a saddle weighing more than 50 pounds over short distances. Horseback riding is engaged in typically once a week, with assistance from others. A living will or advanced directive is believed to be in place, although its status on file is uncertain.     SOCIAL HISTORY  Marital Status:   Exercise: Rides horses once a week  Tobacco: Smokes    Objective   Past Medical History:   Diagnosis Date    Aneurysm     Atrial fibrillation by electrocardiogram     Bilateral carotid bruits     Chest pain     Coronary artery disease involving native coronary artery of native heart with angina pectoris      5/2015- ECHO: EF 60-65% 5/2015- DSE: negative for ischemia. 2012- Stress: negative for ischemia.    Expressive dysphasia 11/16/2017    History of essential hypertension     History of PTCA     12/2010- drug-eluting stent to the LAD.    Hyperlipidemia, mixed     Malaise and fatigue     MI, old     2010, LAD  "stent    Peptic ulcer     NOS    Transient cerebral ischemia 11/16/2017   ,   Past Surgical History:   Procedure Laterality Date    BACK SURGERY      COLONOSCOPY  2023    Dr. Bowden    CORONARY STENT PLACEMENT  12/2010    [facility]ERN[facility] REPAIR  [facility]2025    [facility] [place] [facility]    HIP SURGERY Right 01/2022    SINUS SURGERY  04/2012    TOTAL HIP ARTHROPLASTY Right 09/2022    Dr. Nj, [facility] [place] [facility]   ,   Family History   Problem Relation Age of Onset    Coronary artery disease Other     Colon cancer Mother     Heart attack Father     Heart failure Father    ,   Social History     Tobacco Use    Smoking status: Every Day     Current packs/day: 0.50     Average packs/day: 0.5 packs/day for 70.3 years (35.2 ttl pk-yrs)     Types: Cigarettes     Start date: 1955     Passive exposure: Past    Smokeless tobacco: Former     Types: Snuff, Chew     Quit date: 2000   Vaping Use    Vaping status: Never Used   Substance Use Topics    Alcohol use: Yes     Alcohol/week: 36.0 standard drinks of alcohol     Types: 36 Cans of beer per week     Comment: drinking about 5-7 beers a day except on Sundays - depends on the day. Sometimes more. Had quit/cut back for a short time.    Drug use: Never   , (Not in a hospital admission)  , Allergies: Tramadol    Vital Signs:   /79   Pulse 83   Ht 182.9 cm (72.01\")   Wt 80.8 kg (178 lb 3.2 oz)   SpO2 97%   BMI 24.16 kg/m²        Physical Exam  Vitals reviewed.   Constitutional:       General: He is not in acute distress.     Appearance: He is well-developed.   HENT:      Head: Normocephalic and atraumatic.   Eyes:      General: No scleral icterus.     Conjunctiva/sclera: Conjunctivae normal.      Pupils: Pupils are equal, round, and reactive to light.   Cardiovascular:      Rate and Rhythm: Normal rate.   Pulmonary:      Effort: Pulmonary effort is normal. No respiratory distress.      Breath sounds: Decreased breath sounds present. No wheezing or rales.   Musculoskeletal:         General: Normal range of motion.      " Cervical back: Normal range of motion and neck supple.   Skin:     General: Skin is warm and dry.   Neurological:      Mental Status: He is alert and oriented to person, place, and time.   Psychiatric:         Behavior: Behavior normal.         Thought Content: Thought content normal.         Judgment: Judgment normal.          Result Review :  The following data was reviewed by: TAMELA Guzman on 05/01/2025:    CT Angiogram Chest (05/01/2025 12:19)       No results found for this or any previous visit.             Assessment and Plan  Diagnoses and all orders for this visit:    1. Aneurysm of ascending aorta without rupture (Primary)  Overview:  4/30/24 OV - The ascending aorta measures 4.1 cm in maximum dimension and the aortic root measures 4.0 cm in size without rupture or dissection. Per TAMELA Almazan    Assessment & Plan:  I have personally reviewed CTA of the chest performed 5/1/25 and compared to CTA of the chest performed 4/30/24 and the following is my interpretation:  Mid ascending aorta measures 4.2 cm in short axis in axial view, the distal ascending/proximal arch measures 3.6 cm in maximum dimension in axial view, aortic root measures 4.0 cm in maximum dimension in axial and coronal views, 3.7 in sagittal view.   No evidence of IMH, ERICK, or dissection.     We discussed the natural course history of aortic aneurysmal disease. We discussed the specific size of aneurysm today and potential risk of aortic complications. We discussed the operative treatment of aneursymal disease broadly. We discussed the recommendation to plan surveillance with CT scans. We discussed signs and symptoms of acute aortic pathology and the need to present to the emergency department for further evaluation. Lastly, we discussed the value of exercise while being mindful of a known aneurysm and the potential risk that high intensity, isometric, or valsalva type exercises presents.   Potential medical therapy  including the use of a beta-blocker and perhaps other agents to accomplish strict control of pressure were discussed.     Obtain f/u CTA chest in 1 year.     Orders:  -     CT Angiogram Chest; Future    2. Aortic root aneurysm  Assessment & Plan:  Stable.     Obtain f/u CTA chest in 1 year.       Orders:  -     CT Angiogram Chest; Future    3. Cigarette nicotine dependence without complication  Assessment & Plan:  Solomon Perez  reports that he has been smoking cigarettes. He started smoking about 70 years ago. He has a 35.2 pack-year smoking history. He has been exposed to tobacco smoke. He quit smokeless tobacco use about 25 years ago.  His smokeless tobacco use included snuff and chew. I have educated him on the risk of diseases from using tobacco products such as cancer, COPD, and heart disease.     I advised him to quit and he is not willing to quit.    I spent 3  minutes counseling the patient.       Tobacco use is unchanged.  Smoking cessation counseling was provided.  Tobacco use will be reassessed in 1 year.          Advance Care Planning   ACP discussion was held with the patient during this visit. Patient has an advance directive (not in EMR), copy requested.       Follow Up  TAMELA Guzman  5/1/2025  15:28 CDT    Return in about 1 year (around 5/1/2026) for CTA, with Dr. Osman.    Patient was given instructions and counseling regarding his condition or for health maintenance advice. Please see specific information pulled into the AVS if appropriate.     Please note that portions of this note were completed with a voice recognition program.    Patient or patient representative verbalized consent for the use of Ambient Listening during the visit with  TAMELA Guzman for chart documentation. 5/1/2025  13:57 CDT      Note to Patient:   The 21st Century Cures Act makes medical notes like this available to patients in the interest of transparency; however, please be advised this is a  medical document.  It is intended as [id number] communication.  It is written in medical language and may contain abbreviations or verbiage that are unfamiliar.  It may appear blunt or direct.  Medical documents are intended to carry relevant information, facts as evident, and the clinical opinion of the practitioner.  This note may have been transcribed using a voice dictation system.  Voice-recognition errors may occur.  This should not be taken to alter the content or meaning of this note.

## 2025-05-01 ENCOUNTER — OFFICE VISIT (OUTPATIENT)
Dept: CARDIAC SURGERY | Facility: CLINIC | Age: 80
End: 2025-05-01
Payer: MEDICARE

## 2025-05-01 ENCOUNTER — HOSPITAL ENCOUNTER (OUTPATIENT)
Dept: CT IMAGING | Facility: HOSPITAL | Age: 80
Discharge: HOME | End: 2025-05-01
Payer: MEDICARE

## 2025-05-01 VITALS
HEART RATE: 83 BPM | WEIGHT: 178.2 LBS | HEIGHT: 72 IN | DIASTOLIC BLOOD PRESSURE: 79 MMHG | OXYGEN SATURATION: 97 % | BODY MASS INDEX: 24.14 KG/M2 | SYSTOLIC BLOOD PRESSURE: 123 MMHG

## 2025-05-01 DIAGNOSIS — Q25.43 AORTIC ROOT ANEURYSM: ICD-10-CM

## 2025-05-01 DIAGNOSIS — I71.21 ANEURYSM OF ASCENDING AORTA WITHOUT RUPTURE: Chronic | ICD-10-CM

## 2025-05-01 DIAGNOSIS — I71.21 ANEURYSM OF ASCENDING AORTA WITHOUT RUPTURE: Primary | Chronic | ICD-10-CM

## 2025-05-01 DIAGNOSIS — I77.810 AORTIC ROOT DILATION: ICD-10-CM

## 2025-05-01 DIAGNOSIS — F17.210 CIGARETTE NICOTINE DEPENDENCE WITHOUT COMPLICATION: ICD-10-CM

## 2025-05-01 PROCEDURE — 82565 ASSAY OF CREATININE: CPT

## 2025-05-01 PROCEDURE — 99214 OFFICE O/P EST MOD 30 MIN: CPT | Performed by: NURSE PRACTITIONER

## 2025-05-01 PROCEDURE — 1160F RVW MEDS BY RX/DR IN RCRD: CPT | Performed by: NURSE PRACTITIONER

## 2025-05-01 PROCEDURE — 3074F SYST BP LT 130 MM HG: CPT | Performed by: NURSE PRACTITIONER

## 2025-05-01 PROCEDURE — 1159F MED LIST DOCD IN RCRD: CPT | Performed by: NURSE PRACTITIONER

## 2025-05-01 PROCEDURE — 71275 CT ANGIOGRAPHY CHEST: CPT

## 2025-05-01 PROCEDURE — 3078F DIAST BP <80 MM HG: CPT | Performed by: NURSE PRACTITIONER

## 2025-05-01 PROCEDURE — 25510000001 IOPAMIDOL PER 1 ML

## 2025-05-01 RX ORDER — IOPAMIDOL 755 MG/ML
100 INJECTION, SOLUTION INTRAVASCULAR
Status: COMPLETED | OUTPATIENT
Start: 2025-05-01 | End: 2025-05-01

## 2025-05-01 RX ADMIN — IOPAMIDOL 100 ML: 755 INJECTION, SOLUTION INTRAVENOUS at 12:19

## 2025-05-01 NOTE — ASSESSMENT & PLAN NOTE
Solomon Perez  reports that he has been smoking cigarettes. He started smoking about 70 years ago. He has a 35.2 pack-year smoking history. He has been exposed to tobacco smoke. He quit smokeless tobacco use about 25 years ago.  His smokeless tobacco use included snuff and chew. I have educated him on the risk of diseases from using tobacco products such as cancer, COPD, and heart disease.     I advised him to quit and he is not willing to quit.    I spent 3  minutes counseling the patient.       Tobacco use is unchanged.  Smoking cessation counseling was provided.  Tobacco use will be reassessed in 1 year.

## 2025-05-01 NOTE — ASSESSMENT & PLAN NOTE
I have personally reviewed CTA of the chest performed 5/1/25 and compared to CTA of the chest performed 4/30/24 and the following is my interpretation:  Mid ascending aorta measures 4.2 cm in short axis in axial view, the distal ascending/proximal arch measures 3.6 cm in maximum dimension in axial view, aortic root measures 4.0 cm in maximum dimension in axial and coronal views, 3.7 in sagittal view.   No evidence of IMH, ERICK, or dissection.     We discussed the natural course history of aortic aneurysmal disease. We discussed the specific size of aneurysm today and potential risk of aortic complications. We discussed the operative treatment of aneursymal disease broadly. We discussed the recommendation to plan surveillance with CT scans. We discussed signs and symptoms of acute aortic pathology and the need to present to the emergency department for further evaluation. Lastly, we discussed the value of exercise while being mindful of a known aneurysm and the potential risk that high intensity, isometric, or valsalva type exercises presents.   Potential medical therapy including the use of a beta-blocker and perhaps other agents to accomplish strict control of pressure were discussed.     Obtain f/u CTA chest in 1 year.

## 2025-05-01 NOTE — PATIENT INSTRUCTIONS
"Smoking Tobacco Information, Adult  Smoking tobacco can be harmful to your health. Tobacco contains a toxic colorless chemical called nicotine. Nicotine causes changes in your brain that make you want more and more. This is called addiction. This can make it hard to stop smoking once you start. Tobacco also has other toxic chemicals that can hurt your body and raise your risk of many cancers.  Menthol or \"lite\" tobacco or cigarette brands are not safer than regular brands.  How can smoking tobacco affect me?  Smoking tobacco puts you at risk for:  Cancer. Smoking is most commonly associated with lung cancer, but can also lead to cancer in other parts of the body.  Chronic obstructive pulmonary disease (COPD). This is a long-term lung condition that makes it hard to breathe. It also gets worse over time.  High blood pressure (hypertension), heart disease, stroke, heart attack, and lung infections, such as pneumonia.  Cataracts. This is when the lenses in the eyes become clouded.  Digestive problems. This may include peptic ulcers, heartburn, and gastroesophageal reflux disease (GERD).  Oral health problems, such as gum disease, mouth sores, and tooth loss.  Loss of taste and smell.  Smoking also affects how you look and smell. Smoking may cause:  Wrinkles.  Yellow or stained teeth, fingers, and fingernails.  Bad breath.  Bad-smelling clothes and hair.  Smoking tobacco can also affect your social life, because:  It may be challenging to find places to smoke when away from home. Many workplaces, restaurants, hotels, and public places are tobacco-free.  Smoking is expensive. This is due to the cost of tobacco and the long-term costs of treating health problems from smoking.  Secondhand smoke may affect those around you. Secondhand smoke can cause lung cancer, breathing problems, and heart disease. Children of smokers have a higher risk for:  Sudden infant death syndrome (SIDS).  Ear infections.  Lung infections.  What " actions can I take to prevent health problems?  Quit smoking    Do not start smoking. Quit if you already smoke.  Do not replace cigarette smoking with vaping devices, such as e-cigarettes.  Make a plan to quit smoking and commit to it. Look for programs to help you, and ask your health care provider for recommendations and ideas. Set a date and write down all the reasons you want to quit.  Let your friends and family know you are quitting so they can help and support you. Consider finding friends who also want to quit. It can be easier to quit with someone else, so that you can support each other.  Talk with your health care provider about using nicotine replacement medicines to help you quit. These include gum, lozenges, patches, sprays, or pills.  If you try to quit but return to smoking, stay positive. It is common to slip up when you first quit, so take it one day at a time.  Be prepared for cravings. When you feel the urge to smoke, chew gum or suck on hard candy.  Lifestyle  Stay busy.  Take care of your body. Get plenty of exercise, eat a healthy diet, and drink plenty of water.  Find ways to manage your stress, such as meditation, yoga, exercise, or time spent with friends and family.  Ask your health care provider about having regular tests (screenings) to check for cancer. This may include blood tests, imaging tests, and other tests.  Where to find support  To get support to quit smoking, consider:  Asking your health care provider for more information and resources.  Joining a support group for people who want to quit smoking in your local community. There are many effective programs that may help you to quit.  Calling the smokefree.gov counselor helpline at 4-424-QUIT-NOW (1-862.628.7513).  Where to find more information  You may find more information about quitting smoking from:  Centers for Disease Control and Prevention: cdc.gov/tobacco  Smokefree.gov: smokefree.gov  American Lung Association:  [employer]  Contact a health care provider if:  You have problems breathing.  Your lips, nose, or fingers turn blue.  You have chest pain.  You are coughing up blood.  You feel like you will faint.  You have other health changes that cause you to worry.  Summary  Smoking tobacco can negatively affect your health, the health of those around you, your finances, and your social life.  Do not start smoking. Quit if you already smoke. If you need help quitting, ask your health care provider.  Consider joining a support group for people in your local community who want to quit smoking. There are many effective programs that may help you to quit.  This information is not intended to replace advice given to you by your health care provider. Make sure you discuss any questions you have with your health care provider.  Document Revised: 12/13/2022 Document Reviewed: 12/13/2022  Elsevier Patient Education © 2024 [employer]

## 2025-05-02 LAB — CREAT BLDA-MCNC: 1.2 MG/DL (ref 0.6–1.3)

## 2025-05-06 NOTE — TELEPHONE ENCOUNTER
Caller: Solomon Perez    Relationship: Self    Best call back number:  [phone]    What is the best time to reach you:  ANYTIME    Who are you requesting to speak with (clinical staff, provider,  specific staff member):  CLINICAL     What was the call regarding:  PATIENT RETURNING OFFICE'S CALL.  PATIENT REFUSED TO VERIFY DEMOGRAPHICS.  PATIENT SAID HE'LL CALL BACK LATER.    Do you require a callback:  NO         -You are breathing faster than normal.  -Fever of 100.4 F or higher.  -Chills  -Urinating less than 4 times per day.  -Acting very sleepy and difficult to awaken.  -Vomiting and not able to eat or drink for 12 hours  -3 or more loose, watery bowel movements in 24 hours (Diarrhea)  -Concerns over the appearance of your incision.    Return immediately to the ER:  -Persistent bilateral arm weakness and or numbness >24 hours.  -Short of breath.  -Unable to swallow.

## 2025-08-22 ENCOUNTER — TELEPHONE (OUTPATIENT)
Dept: CARDIOLOGY | Facility: CLINIC | Age: 80
End: 2025-08-22
Payer: MEDICARE